# Patient Record
Sex: FEMALE | Race: BLACK OR AFRICAN AMERICAN | NOT HISPANIC OR LATINO | URBAN - METROPOLITAN AREA
[De-identification: names, ages, dates, MRNs, and addresses within clinical notes are randomized per-mention and may not be internally consistent; named-entity substitution may affect disease eponyms.]

---

## 2018-06-14 ENCOUNTER — OUTPATIENT (OUTPATIENT)
Dept: OUTPATIENT SERVICES | Facility: HOSPITAL | Age: 33
LOS: 1 days | End: 2018-06-14
Payer: COMMERCIAL

## 2018-06-14 VITALS
OXYGEN SATURATION: 100 % | DIASTOLIC BLOOD PRESSURE: 72 MMHG | SYSTOLIC BLOOD PRESSURE: 105 MMHG | RESPIRATION RATE: 16 BRPM | WEIGHT: 175.93 LBS | TEMPERATURE: 98 F | HEIGHT: 68 IN | HEART RATE: 80 BPM

## 2018-06-14 DIAGNOSIS — Z96.7 PRESENCE OF OTHER BONE AND TENDON IMPLANTS: Chronic | ICD-10-CM

## 2018-06-14 DIAGNOSIS — M65.4 RADIAL STYLOID TENOSYNOVITIS [DE QUERVAIN]: ICD-10-CM

## 2018-06-14 DIAGNOSIS — M65.9 SYNOVITIS AND TENOSYNOVITIS, UNSPECIFIED: ICD-10-CM

## 2018-06-14 DIAGNOSIS — Z01.818 ENCOUNTER FOR OTHER PREPROCEDURAL EXAMINATION: ICD-10-CM

## 2018-06-14 LAB
HCG SERPL-ACNC: <1 MIU/ML — SIGNIFICANT CHANGE UP
HCT VFR BLD CALC: 29.4 % — LOW (ref 34.5–45)
HGB BLD-MCNC: 8.9 G/DL — LOW (ref 11.5–15.5)
MCHC RBC-ENTMCNC: 21.9 PG — LOW (ref 27–34)
MCHC RBC-ENTMCNC: 30.3 GM/DL — LOW (ref 32–36)
MCV RBC AUTO: 72.4 FL — LOW (ref 80–100)
NRBC # BLD: 0 /100 WBCS — SIGNIFICANT CHANGE UP (ref 0–0)
PLATELET # BLD AUTO: 322 K/UL — SIGNIFICANT CHANGE UP (ref 150–400)
RBC # BLD: 4.06 M/UL — SIGNIFICANT CHANGE UP (ref 3.8–5.2)
RBC # FLD: 19 % — HIGH (ref 10.3–14.5)
WBC # BLD: 4.19 K/UL — SIGNIFICANT CHANGE UP (ref 3.8–10.5)
WBC # FLD AUTO: 4.19 K/UL — SIGNIFICANT CHANGE UP (ref 3.8–10.5)

## 2018-06-14 PROCEDURE — 85027 COMPLETE CBC AUTOMATED: CPT

## 2018-06-14 PROCEDURE — G0463: CPT

## 2018-06-14 PROCEDURE — 84702 CHORIONIC GONADOTROPIN TEST: CPT

## 2018-06-14 NOTE — H&P PST ADULT - HISTORY OF PRESENT ILLNESS
This is a 33y/o female with no significant PMH. Current dx: DeQuervain's Tenosynovitis Right Wrist. Scheduled: Right Wrist DeQuervain's Release/ Tenolysis/ Extensor Tenotomy.

## 2018-06-14 NOTE — H&P PST ADULT - EXTREMITIES
Subjective   Cass Wilkins is a 41 y.o. female.     History of Present Illness   Travelling to [place] and wants vaccines updated. Will not be in a remote area. Visiting family in [place]. Had Tdap 2017. Due for Hep A.  Still has eczema and rash on legs. Has not seen dermatology yet. Tried Eucrisa without relief.  The following portions of the patient's history were reviewed and updated as appropriate: allergies, current medications, past family history, past medical history, past social history, past surgical history and problem list.    Review of Systems   Constitutional: Negative.  Negative for activity change, fatigue, fever, unexpected weight gain and unexpected weight loss.   HENT: Negative.  Negative for congestion, sneezing and sore throat.    Eyes: Negative.  Negative for blurred vision, double vision and visual disturbance.   Respiratory: Negative.  Negative for cough, chest tightness, shortness of breath and wheezing.    Cardiovascular: Negative.  Negative for chest pain, palpitations and leg swelling.   Gastrointestinal: Negative.  Negative for abdominal distention, abdominal pain, blood in stool, constipation, diarrhea and nausea.   Endocrine: Negative.  Negative for cold intolerance and heat intolerance.   Genitourinary: Negative.  Negative for urinary incontinence, dysuria, frequency and urgency.   Musculoskeletal: Negative.  Negative for arthralgias and myalgias.   Skin: Negative.  Negative for rash.   Allergic/Immunologic: Negative.    Neurological: Negative.  Negative for dizziness, syncope, numbness, headaches and memory problem.   Hematological: Negative.  Negative for adenopathy.   Psychiatric/Behavioral: Negative.  Negative for suicidal ideas and depressed mood. The patient is not nervous/anxious.    All other systems reviewed and are negative.      Objective   Physical Exam   Constitutional: She is oriented to person, place, and time. She appears well-developed and well-nourished.   HENT:   Head:  Normocephalic.   Right Ear: External ear normal.   Left Ear: External ear normal.   Nose: Nose normal.   Mouth/Throat: Oropharynx is clear and moist. No oropharyngeal exudate.   Eyes: Conjunctivae and EOM are normal. Pupils are equal, round, and reactive to light.   Neck: Normal range of motion. Neck supple. No thyromegaly present.   Cardiovascular: Normal rate, regular rhythm, normal heart sounds and intact distal pulses.    No murmur heard.  Pulmonary/Chest: Effort normal and breath sounds normal. No respiratory distress. She exhibits no tenderness.   Abdominal: Soft. Bowel sounds are normal. She exhibits no distension and no mass. There is no tenderness. There is no rebound and no guarding.   Musculoskeletal: Normal range of motion.   Lymphadenopathy:     She has no cervical adenopathy.   Neurological: She is alert and oriented to person, place, and time. She has normal reflexes. She displays normal reflexes. She exhibits normal muscle tone. Coordination normal.   Skin: Skin is warm and dry. No lesion and no rash noted. She is not diaphoretic. No erythema.   Psychiatric: She has a normal mood and affect. Her behavior is normal. Judgment and thought content normal.   Nursing note and vitals reviewed.        Assessment/Plan   Cass was seen today for immunizations.    Diagnoses and all orders for this visit:    Immunization counseling  -     hepatitis A (HAVRIX) vaccine 1,440 Units; Inject 1 mL into the shoulder, thigh, or buttocks During Hospitalization for Immunization.    Eczema, unspecified type  -     Ambulatory Referral to Dermatology    Other orders  -     fluocinonide-emollient (LIDEX-E) 0.05 % cream; Apply  topically Daily.                detailed exam

## 2018-06-18 RX ORDER — SODIUM CHLORIDE 9 MG/ML
1000 INJECTION, SOLUTION INTRAVENOUS
Qty: 0 | Refills: 0 | Status: DISCONTINUED | OUTPATIENT
Start: 2018-06-27 | End: 2018-06-27

## 2018-06-26 ENCOUNTER — TRANSCRIPTION ENCOUNTER (OUTPATIENT)
Age: 33
End: 2018-06-26

## 2018-06-26 RX ORDER — ACETAMINOPHEN 500 MG
975 TABLET ORAL ONCE
Qty: 0 | Refills: 0 | Status: COMPLETED | OUTPATIENT
Start: 2018-06-27 | End: 2018-06-27

## 2018-06-27 ENCOUNTER — OUTPATIENT (OUTPATIENT)
Dept: OUTPATIENT SERVICES | Facility: HOSPITAL | Age: 33
LOS: 1 days | End: 2018-06-27
Payer: COMMERCIAL

## 2018-06-27 VITALS
HEART RATE: 58 BPM | WEIGHT: 175.93 LBS | SYSTOLIC BLOOD PRESSURE: 120 MMHG | RESPIRATION RATE: 13 BRPM | TEMPERATURE: 99 F | HEIGHT: 68 IN | DIASTOLIC BLOOD PRESSURE: 55 MMHG | OXYGEN SATURATION: 100 %

## 2018-06-27 VITALS
SYSTOLIC BLOOD PRESSURE: 124 MMHG | DIASTOLIC BLOOD PRESSURE: 78 MMHG | TEMPERATURE: 98 F | OXYGEN SATURATION: 99 % | RESPIRATION RATE: 14 BRPM | HEART RATE: 67 BPM

## 2018-06-27 DIAGNOSIS — Z96.7 PRESENCE OF OTHER BONE AND TENDON IMPLANTS: Chronic | ICD-10-CM

## 2018-06-27 DIAGNOSIS — Z01.818 ENCOUNTER FOR OTHER PREPROCEDURAL EXAMINATION: ICD-10-CM

## 2018-06-27 DIAGNOSIS — M65.4 RADIAL STYLOID TENOSYNOVITIS [DE QUERVAIN]: ICD-10-CM

## 2018-06-27 LAB — HCG UR QL: NEGATIVE — SIGNIFICANT CHANGE UP

## 2018-06-27 PROCEDURE — 25109 EXCISE TENDON FOREARM/WRIST: CPT | Mod: RT

## 2018-06-27 PROCEDURE — 87340 HEPATITIS B SURFACE AG IA: CPT

## 2018-06-27 PROCEDURE — 86803 HEPATITIS C AB TEST: CPT

## 2018-06-27 PROCEDURE — 36415 COLL VENOUS BLD VENIPUNCTURE: CPT

## 2018-06-27 PROCEDURE — 86703 HIV-1/HIV-2 1 RESULT ANTBDY: CPT

## 2018-06-27 PROCEDURE — 25295 RELEASE WRIST/FOREARM TENDON: CPT | Mod: RT

## 2018-06-27 PROCEDURE — 87522 HEPATITIS C REVRS TRNSCRPJ: CPT

## 2018-06-27 PROCEDURE — 86704 HEP B CORE ANTIBODY TOTAL: CPT

## 2018-06-27 PROCEDURE — 86706 HEP B SURFACE ANTIBODY: CPT

## 2018-06-27 PROCEDURE — 87535 HIV-1 PROBE&REVERSE TRNSCRPJ: CPT

## 2018-06-27 PROCEDURE — 87389 HIV-1 AG W/HIV-1&-2 AB AG IA: CPT

## 2018-06-27 PROCEDURE — 81025 URINE PREGNANCY TEST: CPT

## 2018-06-27 RX ORDER — CEFAZOLIN SODIUM 1 G
2000 VIAL (EA) INJECTION ONCE
Qty: 0 | Refills: 0 | Status: COMPLETED | OUTPATIENT
Start: 2018-06-27 | End: 2018-06-27

## 2018-06-27 RX ADMIN — Medication 975 MILLIGRAM(S): at 13:32

## 2018-06-27 RX ADMIN — SODIUM CHLORIDE 75 MILLILITER(S): 9 INJECTION, SOLUTION INTRAVENOUS at 13:32

## 2019-11-29 ENCOUNTER — INPATIENT (INPATIENT)
Facility: HOSPITAL | Age: 34
LOS: 1 days | Discharge: HOME | End: 2019-12-01
Attending: INTERNAL MEDICINE | Admitting: INTERNAL MEDICINE
Payer: COMMERCIAL

## 2019-11-29 VITALS
DIASTOLIC BLOOD PRESSURE: 70 MMHG | WEIGHT: 156.09 LBS | RESPIRATION RATE: 18 BRPM | OXYGEN SATURATION: 100 % | TEMPERATURE: 99 F | HEART RATE: 84 BPM | HEIGHT: 68 IN | SYSTOLIC BLOOD PRESSURE: 102 MMHG

## 2019-11-29 DIAGNOSIS — Z96.7 PRESENCE OF OTHER BONE AND TENDON IMPLANTS: Chronic | ICD-10-CM

## 2019-11-29 PROBLEM — M65.9 SYNOVITIS AND TENOSYNOVITIS, UNSPECIFIED: Chronic | Status: ACTIVE | Noted: 2018-06-14

## 2019-11-29 PROBLEM — S42.009A FRACTURE OF UNSPECIFIED PART OF UNSPECIFIED CLAVICLE, INITIAL ENCOUNTER FOR CLOSED FRACTURE: Chronic | Status: ACTIVE | Noted: 2018-06-14

## 2019-11-29 LAB
ABO RH CONFIRMATION: SIGNIFICANT CHANGE UP
ALBUMIN SERPL ELPH-MCNC: 3.6 G/DL — SIGNIFICANT CHANGE UP (ref 3.3–5)
ALP SERPL-CCNC: 54 U/L — SIGNIFICANT CHANGE UP (ref 40–120)
ALT FLD-CCNC: 15 U/L — SIGNIFICANT CHANGE UP (ref 12–78)
ANION GAP SERPL CALC-SCNC: 6 MMOL/L — SIGNIFICANT CHANGE UP (ref 5–17)
ANISOCYTOSIS BLD QL: SIGNIFICANT CHANGE UP
APTT BLD: 28.8 SEC — SIGNIFICANT CHANGE UP (ref 27.5–36.3)
AST SERPL-CCNC: 15 U/L — SIGNIFICANT CHANGE UP (ref 15–37)
BASOPHILS # BLD AUTO: 0.04 K/UL — SIGNIFICANT CHANGE UP (ref 0–0.2)
BASOPHILS NFR BLD AUTO: 0.5 % — SIGNIFICANT CHANGE UP (ref 0–2)
BILIRUB SERPL-MCNC: 0.3 MG/DL — SIGNIFICANT CHANGE UP (ref 0.2–1.2)
BLD GP AB SCN SERPL QL: SIGNIFICANT CHANGE UP
BUN SERPL-MCNC: 8 MG/DL — SIGNIFICANT CHANGE UP (ref 7–23)
CALCIUM SERPL-MCNC: 8.9 MG/DL — SIGNIFICANT CHANGE UP (ref 8.5–10.1)
CHLORIDE SERPL-SCNC: 110 MMOL/L — HIGH (ref 96–108)
CO2 SERPL-SCNC: 24 MMOL/L — SIGNIFICANT CHANGE UP (ref 22–31)
CREAT SERPL-MCNC: 0.77 MG/DL — SIGNIFICANT CHANGE UP (ref 0.5–1.3)
ELLIPTOCYTES BLD QL SMEAR: SLIGHT — SIGNIFICANT CHANGE UP
EOSINOPHIL # BLD AUTO: 0.09 K/UL — SIGNIFICANT CHANGE UP (ref 0–0.5)
EOSINOPHIL NFR BLD AUTO: 1.2 % — SIGNIFICANT CHANGE UP (ref 0–6)
GLUCOSE SERPL-MCNC: 84 MG/DL — SIGNIFICANT CHANGE UP (ref 70–99)
HCG SERPL-ACNC: <1 MIU/ML — SIGNIFICANT CHANGE UP
HCT VFR BLD CALC: 22.5 % — LOW (ref 34.5–45)
HGB BLD-MCNC: 6.2 G/DL — CRITICAL LOW (ref 11.5–15.5)
HYPOCHROMIA BLD QL: SLIGHT — SIGNIFICANT CHANGE UP
IMM GRANULOCYTES NFR BLD AUTO: 0.1 % — SIGNIFICANT CHANGE UP (ref 0–1.5)
INR BLD: 1.14 RATIO — SIGNIFICANT CHANGE UP (ref 0.88–1.16)
LYMPHOCYTES # BLD AUTO: 2.87 K/UL — SIGNIFICANT CHANGE UP (ref 1–3.3)
LYMPHOCYTES # BLD AUTO: 36.7 % — SIGNIFICANT CHANGE UP (ref 13–44)
MANUAL SMEAR VERIFICATION: SIGNIFICANT CHANGE UP
MCHC RBC-ENTMCNC: 18.3 PG — LOW (ref 27–34)
MCHC RBC-ENTMCNC: 27.6 GM/DL — LOW (ref 32–36)
MCV RBC AUTO: 66.6 FL — LOW (ref 80–100)
MICROCYTES BLD QL: SIGNIFICANT CHANGE UP
MONOCYTES # BLD AUTO: 0.54 K/UL — SIGNIFICANT CHANGE UP (ref 0–0.9)
MONOCYTES NFR BLD AUTO: 6.9 % — SIGNIFICANT CHANGE UP (ref 2–14)
NEUTROPHILS # BLD AUTO: 4.27 K/UL — SIGNIFICANT CHANGE UP (ref 1.8–7.4)
NEUTROPHILS NFR BLD AUTO: 54.6 % — SIGNIFICANT CHANGE UP (ref 43–77)
NRBC # BLD: 0 /100 WBCS — SIGNIFICANT CHANGE UP (ref 0–0)
OVALOCYTES BLD QL SMEAR: SLIGHT — SIGNIFICANT CHANGE UP
PLAT MORPH BLD: NORMAL — SIGNIFICANT CHANGE UP
PLATELET # BLD AUTO: 637 K/UL — HIGH (ref 150–400)
POLYCHROMASIA BLD QL SMEAR: SLIGHT — SIGNIFICANT CHANGE UP
POTASSIUM SERPL-MCNC: 4.1 MMOL/L — SIGNIFICANT CHANGE UP (ref 3.5–5.3)
POTASSIUM SERPL-SCNC: 4.1 MMOL/L — SIGNIFICANT CHANGE UP (ref 3.5–5.3)
PROT SERPL-MCNC: 7.5 GM/DL — SIGNIFICANT CHANGE UP (ref 6–8.3)
PROTHROM AB SERPL-ACNC: 12.8 SEC — SIGNIFICANT CHANGE UP (ref 10–12.9)
RBC # BLD: 3.38 M/UL — LOW (ref 3.8–5.2)
RBC # FLD: 28.3 % — HIGH (ref 10.3–14.5)
RBC BLD AUTO: ABNORMAL
SODIUM SERPL-SCNC: 140 MMOL/L — SIGNIFICANT CHANGE UP (ref 135–145)
TARGETS BLD QL SMEAR: SLIGHT — SIGNIFICANT CHANGE UP
WBC # BLD: 7.82 K/UL — SIGNIFICANT CHANGE UP (ref 3.8–10.5)
WBC # FLD AUTO: 7.82 K/UL — SIGNIFICANT CHANGE UP (ref 3.8–10.5)

## 2019-11-29 PROCEDURE — 99291 CRITICAL CARE FIRST HOUR: CPT

## 2019-11-29 PROCEDURE — 99223 1ST HOSP IP/OBS HIGH 75: CPT

## 2019-11-29 PROCEDURE — 76830 TRANSVAGINAL US NON-OB: CPT | Mod: 26

## 2019-11-29 NOTE — ED ADULT NURSE REASSESSMENT NOTE - NS ED NURSE REASSESS COMMENT FT1
Assuming patient's care for coverage. Report received from RN and in [employer] at this time. Assessment available on [facility]. Will continue to monitor

## 2019-11-29 NOTE — ED ADULT NURSE REASSESSMENT NOTE - NS ED NURSE REASSESS COMMENT FT1
rcvd pt aaox3,  no acute distress noted.  no adverse reaction to blood transfusion noted.  pt denies pain at this time.

## 2019-11-29 NOTE — H&P ADULT - NSHPLABSRESULTS_GEN_ALL_CORE
6.2    7.82  )-----------( 637      ( 29 Nov 2019 13:49 )             22.5     11-29    140  |  110<H>  |  8   ----------------------------<  84  4.1   |  24  |  0.77    Ca    8.9      29 Nov 2019 13:49    TPro  7.5  /  Alb  3.6  /  TBili  0.3  /  DBili  x   /  AST  15  /  ALT  15  /  AlkPhos  54  11-29    PT/INR - ( 29 Nov 2019 13:49 )   PT: 12.8 sec;   INR: 1.14 ratio         PTT - ( 29 Nov 2019 13:49 )  PTT:28.8 sec

## 2019-11-29 NOTE — ED ADULT NURSE NOTE - OBJECTIVE STATEMENT
Patient sent to  ED from PCP for  abnormal lab, patient voiced pain to R/ LLQ and SOb for the past 2 months on and off

## 2019-11-29 NOTE — H&P ADULT - ASSESSMENT
35 y/o female with a PMH of fibroids and anemia, sent to ED by PMD for hemoglobin (5.9, 11-25-19).  Pt complains of chronic heavy menstruation and has fibroid uterus.    Severe anemia:  - Hb 6.2 today  - Will transfuse 1 unit PRBC  - Likely secondary to menorrhagia, though will consider doing further hem work up, can be done outpt  - Start PO iron supplement.  - Gyn to see pt.    DVT ppx:  - SCds

## 2019-11-29 NOTE — ED PROVIDER NOTE - OBJECTIVE STATEMENT
35 y/o female with a PMHx of fibroids and anemia, sent to ED by PMD for hemoglobin (5.9, 11-25-19). Pt reports she has been having heavy menstrual bleeding from 11-19 to 11-26. C/o generalized weakness, denies CP, SOB, palpitations, and bleeding at this time. 33 y/o female with a PMHx of fibroids and anemia, sent to ED by PMD for hemoglobin (5.9, 11-25-19). Pt reports she has been having heavy menstrual bleeding from 11-19 to 11-26. C/o generalized weakness and dyspnea on exertion-  denies CP, palpitations, and bleeding at this time. Reports right sided pelvic pain, non radiating, sharp. Denies nausea, vomiting, diarrhea, fevers, chills, urinary symptoms.

## 2019-11-29 NOTE — H&P ADULT - NSICDXPASTSURGICALHX_GEN_ALL_CORE_FT
PAST SURGICAL HISTORY:  S/P ORIF (open reduction internal fixation) fracture ' 2009  Left Clavicle fracture

## 2019-11-29 NOTE — ED PROVIDER NOTE - CLINICAL SUMMARY MEDICAL DECISION MAKING FREE TEXT BOX
Female presents to ED for evaluation of low hemoglobin, LLOYD, generalized weakness - Will check labs, imaging, medicate, consult gyn, adm

## 2019-11-29 NOTE — ED ADULT TRIAGE NOTE - CHIEF COMPLAINT QUOTE
pt has fibroids and heavy menstruation here for evaluation of anemia due to menorrhea, last day of menstrual cycle yesterday, pt with right sided abdomen pain

## 2019-11-29 NOTE — ED ADULT NURSE NOTE - NSIMPLEMENTINTERV_GEN_ALL_ED
Implemented All Universal Safety Interventions:  [place] to call system. Call bell, personal items and telephone within reach. Instruct patient to call for assistance. Room bathroom lighting operational. Non-slip footwear when patient is off stretcher. Physically safe environment: no spills, clutter or unnecessary equipment. Stretcher in lowest position, wheels locked, appropriate side rails in place.

## 2019-11-29 NOTE — ED ADULT NURSE NOTE - PMH
Anemia    Clavicle fracture  ' 2009  Left. surgically treated  Tenosynovitis of hand or wrist  Right

## 2019-11-29 NOTE — H&P ADULT - NSICDXPASTMEDICALHX_GEN_ALL_CORE_FT
PAST MEDICAL HISTORY:  Anemia     Clavicle fracture ' 2009  Left. surgically treated    Tenosynovitis of hand or wrist Right

## 2019-11-29 NOTE — ED PROVIDER NOTE - NS ED ROS FT
Constitutional: no fevers or chills, +weakness  Cardiac: no palpitations, chest pain  Lungs: no shortness of breath, wheezes  Abd: no abd pain, nausea, vomiting, diarrhea  Genitourinary: no dysuria, increased urinary frequency, hematuria  Neurology: no sensorimotor deficits, no dizziness, no headache, no visual changes  Skin: no rashes  All other ROS negative except as per HPI Constitutional: no fevers or chills, +weakness  Cardiac: no palpitations, chest pain  Lungs: no shortness of breath, wheezes  Abd: + abd pain, denies nausea, vomiting, diarrhea  Genitourinary: no dysuria, increased urinary frequency, hematuria  Neurology: no sensorimotor deficits, no dizziness, no headache, no visual changes  Skin: no rashes  All other ROS negative except as per HPI

## 2019-11-29 NOTE — H&P ADULT - HISTORY OF PRESENT ILLNESS
35 y/o female with a PMH of fibroids and anemia, sent to ED by PMD for hemoglobin (5.9, 11-25-19).  Pt complains of chronic heavy menstruation and has fibroid uterus.  Pt saw her gyn ~ 2 yrs agio and PMD ~ 1yrs ago until this recent visit.  Also has some rt groin pain that she intermittently had before too.

## 2019-11-30 LAB
ALBUMIN SERPL ELPH-MCNC: 3.3 G/DL — SIGNIFICANT CHANGE UP (ref 3.3–5)
ALP SERPL-CCNC: 57 U/L — SIGNIFICANT CHANGE UP (ref 40–120)
ALT FLD-CCNC: 14 U/L — SIGNIFICANT CHANGE UP (ref 12–78)
ANION GAP SERPL CALC-SCNC: 4 MMOL/L — LOW (ref 5–17)
AST SERPL-CCNC: 14 U/L — LOW (ref 15–37)
BILIRUB SERPL-MCNC: 0.4 MG/DL — SIGNIFICANT CHANGE UP (ref 0.2–1.2)
BUN SERPL-MCNC: 8 MG/DL — SIGNIFICANT CHANGE UP (ref 7–23)
CALCIUM SERPL-MCNC: 8.7 MG/DL — SIGNIFICANT CHANGE UP (ref 8.5–10.1)
CHLORIDE SERPL-SCNC: 112 MMOL/L — HIGH (ref 96–108)
CO2 SERPL-SCNC: 25 MMOL/L — SIGNIFICANT CHANGE UP (ref 22–31)
CREAT SERPL-MCNC: 0.79 MG/DL — SIGNIFICANT CHANGE UP (ref 0.5–1.3)
GLUCOSE SERPL-MCNC: 78 MG/DL — SIGNIFICANT CHANGE UP (ref 70–99)
HCT VFR BLD CALC: 25.9 % — LOW (ref 34.5–45)
HGB BLD-MCNC: 7.4 G/DL — LOW (ref 11.5–15.5)
MCHC RBC-ENTMCNC: 19.8 PG — LOW (ref 27–34)
MCHC RBC-ENTMCNC: 28.6 GM/DL — LOW (ref 32–36)
MCV RBC AUTO: 69.3 FL — LOW (ref 80–100)
NRBC # BLD: 0 /100 WBCS — SIGNIFICANT CHANGE UP (ref 0–0)
PLATELET # BLD AUTO: 616 K/UL — HIGH (ref 150–400)
POTASSIUM SERPL-MCNC: 4.3 MMOL/L — SIGNIFICANT CHANGE UP (ref 3.5–5.3)
POTASSIUM SERPL-SCNC: 4.3 MMOL/L — SIGNIFICANT CHANGE UP (ref 3.5–5.3)
PROT SERPL-MCNC: 6.7 GM/DL — SIGNIFICANT CHANGE UP (ref 6–8.3)
RBC # BLD: 3.74 M/UL — LOW (ref 3.8–5.2)
RBC # FLD: 29 % — HIGH (ref 10.3–14.5)
SODIUM SERPL-SCNC: 141 MMOL/L — SIGNIFICANT CHANGE UP (ref 135–145)
WBC # BLD: 7.39 K/UL — SIGNIFICANT CHANGE UP (ref 3.8–10.5)
WBC # FLD AUTO: 7.39 K/UL — SIGNIFICANT CHANGE UP (ref 3.8–10.5)

## 2019-11-30 PROCEDURE — 99233 SBSQ HOSP IP/OBS HIGH 50: CPT

## 2019-11-30 NOTE — CHART NOTE - NSCHARTNOTEFT_GEN_A_CORE
Discussed with Dr HOGAN, Once patient is medically stable and blood transfusions complete, may be discharged to f/u in his office for further treatment.

## 2019-11-30 NOTE — PROGRESS NOTE ADULT - ASSESSMENT
35 y/o female with a PMH of fibroids and anemia, sent to ED by PMD for hemoglobin (5.9, 11-25-19).  Pt complains of chronic heavy menstruation and has fibroid uterus.    Severe anemia:  - Hb 6.2 on admission.  - S/P 1 unit PRBC  - Likely secondary to menorrhagia, though will consider doing further hem work up, can be done outpt  - Started PO iron supplement.  - Gyn to see pt.    DVT ppx:  - SCDs

## 2019-11-30 NOTE — PROGRESS NOTE ADULT - SUBJECTIVE AND OBJECTIVE BOX
Patient is a 34y old  Female who presents with a chief complaint of symptomatic anemia (29 Nov 2019 19:24)      INTERVAL HPI/OVERNIGHT EVENTS:  Pt was seen and examined, no acute events.    MEDICATIONS  (STANDING):    MEDICATIONS  (PRN):      Allergies  No Known Allergies      Vital Signs Last 24 Hrs  T(C): 37.1 (30 Nov 2019 11:09), Max: 37.1 (30 Nov 2019 11:09)  T(F): 98.7 (30 Nov 2019 11:09), Max: 98.7 (30 Nov 2019 11:09)  HR: 65 (30 Nov 2019 11:09) (61 - 71)  BP: 102/56 (30 Nov 2019 11:09) (102/56 - 124/76)  BP(mean): --  RR: 16 (30 Nov 2019 11:09) (14 - 18)  SpO2: 100% (30 Nov 2019 11:09) (97% - 100%)    PHYSICAL EXAM:  GENERAL: NAD  HEAD:  Atraumatic  EYES: PERRLA  NERVOUS SYSTEM:  A, o x 3, non focal  CHEST/LUNG: Clear  HEART: RRR  ABDOMEN: Soft, non tender  EXTREMITIES:  no edema      LABS:                        7.4    7.39  )-----------( 616      ( 30 Nov 2019 07:25 )             25.9     11-30    141  |  112<H>  |  8   ----------------------------<  78  4.3   |  25  |  0.79    Ca    8.7      30 Nov 2019 07:25    TPro  6.7  /  Alb  3.3  /  TBili  0.4  /  DBili  x   /  AST  14<L>  /  ALT  14  /  AlkPhos  57  11-30    PT/INR - ( 29 Nov 2019 13:49 )   PT: 12.8 sec;   INR: 1.14 ratio         PTT - ( 29 Nov 2019 13:49 )  PTT:28.8 sec    CAPILLARY BLOOD GLUCOSE      RADIOLOGY & ADDITIONAL TESTS:    Imaging Personally Reviewed:  [ ] YES  [ ] NO    Consultant(s) Notes Reviewed:  [ ] YES  [ ] NO    Care Discussed with Consultants/Other Providers [ ] YES  [ ] NO

## 2019-12-01 ENCOUNTER — TRANSCRIPTION ENCOUNTER (OUTPATIENT)
Age: 34
End: 2019-12-01

## 2019-12-01 VITALS
HEART RATE: 74 BPM | TEMPERATURE: 99 F | SYSTOLIC BLOOD PRESSURE: 112 MMHG | DIASTOLIC BLOOD PRESSURE: 66 MMHG | OXYGEN SATURATION: 100 % | RESPIRATION RATE: 18 BRPM

## 2019-12-01 LAB
HCT VFR BLD CALC: 26.9 % — LOW (ref 34.5–45)
HGB BLD-MCNC: 7.7 G/DL — LOW (ref 11.5–15.5)
MCHC RBC-ENTMCNC: 19.8 PG — LOW (ref 27–34)
MCHC RBC-ENTMCNC: 28.6 GM/DL — LOW (ref 32–36)
MCV RBC AUTO: 69.3 FL — LOW (ref 80–100)
NRBC # BLD: 0 /100 WBCS — SIGNIFICANT CHANGE UP (ref 0–0)
PLATELET # BLD AUTO: 652 K/UL — HIGH (ref 150–400)
RBC # BLD: 3.88 M/UL — SIGNIFICANT CHANGE UP (ref 3.8–5.2)
RBC # FLD: 29.2 % — HIGH (ref 10.3–14.5)
WBC # BLD: 7.84 K/UL — SIGNIFICANT CHANGE UP (ref 3.8–10.5)
WBC # FLD AUTO: 7.84 K/UL — SIGNIFICANT CHANGE UP (ref 3.8–10.5)

## 2019-12-01 PROCEDURE — 99233 SBSQ HOSP IP/OBS HIGH 50: CPT

## 2019-12-01 RX ORDER — FERROUS SULFATE 325(65) MG
1 TABLET ORAL
Qty: 60 | Refills: 0
Start: 2019-12-01 | End: 2019-12-30

## 2019-12-01 NOTE — DISCHARGE NOTE NURSING/CASE MANAGEMENT/SOCIAL WORK - PATIENT PORTAL LINK FT
You can access the FollowMyHealth Patient Portal offered by [facility] by registering at the following website: [email or web address]. By joining [employer]’s FollowMyHealth portal, you will also be able to view your health information using other applications (apps) compatible with our system.

## 2019-12-01 NOTE — DISCHARGE NOTE PROVIDER - CARE PROVIDER_API CALL
Benitez Betancur)  Obstetrics and Gynecology  [address] [place] [address]  Phone: [phone]  Fax: [phone]  Follow Up Time:     Raulito Dennison)  Hematology; Internal Medicine; Medical Oncology  [address], [address]  [place] [address]  Phone: [phone]  Fax: [phone]  Follow Up Time:

## 2019-12-01 NOTE — DISCHARGE NOTE PROVIDER - HOSPITAL COURSE
35 y/o female with a PMH of fibroids and anemia, sent to ED by PMD for hemoglobin (5.9, 11-25-19).    Pt complains of chronic heavy menstruation and has fibroid uterus.        Severe anemia:    - Hb 6.2 on admission.    - S/P 1 unit PRBC    - Likely secondary to menorrhagia, though will consider doing further hem work up, can be done outpt    - Started PO iron supplement.    - Gyn follow up.        DVT ppx:    - SCDs

## 2019-12-01 NOTE — DISCHARGE NOTE PROVIDER - NSDCCPCAREPLAN_GEN_ALL_CORE_FT
PRINCIPAL DISCHARGE DIAGNOSIS  Diagnosis: Anemia  Assessment and Plan of Treatment: iron supplement  Follo wup with Gyn and hemoatologist      SECONDARY DISCHARGE DIAGNOSES  Diagnosis: Menorrhagia  Assessment and Plan of Treatment: Follow up Gyn

## 2019-12-05 DIAGNOSIS — D64.9 ANEMIA, UNSPECIFIED: ICD-10-CM

## 2019-12-05 DIAGNOSIS — D25.9 LEIOMYOMA OF UTERUS, UNSPECIFIED: ICD-10-CM

## 2019-12-05 DIAGNOSIS — N92.0 EXCESSIVE AND FREQUENT MENSTRUATION WITH REGULAR CYCLE: ICD-10-CM

## 2019-12-12 DIAGNOSIS — D64.9 ANEMIA, UNSPECIFIED: ICD-10-CM

## 2020-07-07 ENCOUNTER — EMERGENCY (EMERGENCY)
Facility: HOSPITAL | Age: 35
LOS: 0 days | Discharge: HOME | End: 2020-07-07
Attending: EMERGENCY MEDICINE
Payer: COMMERCIAL

## 2020-07-07 VITALS
SYSTOLIC BLOOD PRESSURE: 116 MMHG | TEMPERATURE: 98 F | RESPIRATION RATE: 19 BRPM | HEART RATE: 64 BPM | WEIGHT: 167.99 LBS | OXYGEN SATURATION: 100 % | DIASTOLIC BLOOD PRESSURE: 71 MMHG | HEIGHT: 68 IN

## 2020-07-07 VITALS
DIASTOLIC BLOOD PRESSURE: 66 MMHG | TEMPERATURE: 98 F | HEART RATE: 66 BPM | OXYGEN SATURATION: 99 % | SYSTOLIC BLOOD PRESSURE: 119 MMHG | RESPIRATION RATE: 18 BRPM

## 2020-07-07 DIAGNOSIS — Z96.7 PRESENCE OF OTHER BONE AND TENDON IMPLANTS: Chronic | ICD-10-CM

## 2020-07-07 LAB
ALBUMIN SERPL ELPH-MCNC: 3.4 G/DL — SIGNIFICANT CHANGE UP (ref 3.3–5)
ALP SERPL-CCNC: 52 U/L — SIGNIFICANT CHANGE UP (ref 40–120)
ALT FLD-CCNC: 21 U/L — SIGNIFICANT CHANGE UP (ref 12–78)
ANION GAP SERPL CALC-SCNC: 5 MMOL/L — SIGNIFICANT CHANGE UP (ref 5–17)
APPEARANCE UR: CLEAR — SIGNIFICANT CHANGE UP
AST SERPL-CCNC: 8 U/L — LOW (ref 15–37)
BASOPHILS # BLD AUTO: 0.01 K/UL — SIGNIFICANT CHANGE UP (ref 0–0.2)
BASOPHILS NFR BLD AUTO: 0.2 % — SIGNIFICANT CHANGE UP (ref 0–2)
BILIRUB SERPL-MCNC: 0.4 MG/DL — SIGNIFICANT CHANGE UP (ref 0.2–1.2)
BILIRUB UR-MCNC: ABNORMAL
BUN SERPL-MCNC: 4 MG/DL — LOW (ref 7–23)
CALCIUM SERPL-MCNC: 8.2 MG/DL — LOW (ref 8.5–10.1)
CHLORIDE SERPL-SCNC: 109 MMOL/L — HIGH (ref 96–108)
CO2 SERPL-SCNC: 26 MMOL/L — SIGNIFICANT CHANGE UP (ref 22–31)
COLOR SPEC: ABNORMAL
CREAT SERPL-MCNC: 0.68 MG/DL — SIGNIFICANT CHANGE UP (ref 0.5–1.3)
DIFF PNL FLD: NEGATIVE — SIGNIFICANT CHANGE UP
EOSINOPHIL # BLD AUTO: 0.1 K/UL — SIGNIFICANT CHANGE UP (ref 0–0.5)
EOSINOPHIL NFR BLD AUTO: 2 % — SIGNIFICANT CHANGE UP (ref 0–6)
EPI CELLS # UR: ABNORMAL
GLUCOSE SERPL-MCNC: 78 MG/DL — SIGNIFICANT CHANGE UP (ref 70–99)
GLUCOSE UR QL: NEGATIVE MG/DL — SIGNIFICANT CHANGE UP
HCG SERPL-ACNC: <1 MIU/ML — SIGNIFICANT CHANGE UP
HCT VFR BLD CALC: 32.5 % — LOW (ref 34.5–45)
HGB BLD-MCNC: 10.6 G/DL — LOW (ref 11.5–15.5)
IMM GRANULOCYTES NFR BLD AUTO: 0 % — SIGNIFICANT CHANGE UP (ref 0–1.5)
KETONES UR-MCNC: NEGATIVE — SIGNIFICANT CHANGE UP
LEUKOCYTE ESTERASE UR-ACNC: NEGATIVE — SIGNIFICANT CHANGE UP
LYMPHOCYTES # BLD AUTO: 1.97 K/UL — SIGNIFICANT CHANGE UP (ref 1–3.3)
LYMPHOCYTES # BLD AUTO: 38.9 % — SIGNIFICANT CHANGE UP (ref 13–44)
MCHC RBC-ENTMCNC: 28.4 PG — SIGNIFICANT CHANGE UP (ref 27–34)
MCHC RBC-ENTMCNC: 32.6 GM/DL — SIGNIFICANT CHANGE UP (ref 32–36)
MCV RBC AUTO: 87.1 FL — SIGNIFICANT CHANGE UP (ref 80–100)
MONOCYTES # BLD AUTO: 0.37 K/UL — SIGNIFICANT CHANGE UP (ref 0–0.9)
MONOCYTES NFR BLD AUTO: 7.3 % — SIGNIFICANT CHANGE UP (ref 2–14)
NEUTROPHILS # BLD AUTO: 2.62 K/UL — SIGNIFICANT CHANGE UP (ref 1.8–7.4)
NEUTROPHILS NFR BLD AUTO: 51.6 % — SIGNIFICANT CHANGE UP (ref 43–77)
NITRITE UR-MCNC: POSITIVE
NRBC # BLD: 0 /100 WBCS — SIGNIFICANT CHANGE UP (ref 0–0)
PH UR: 5 — SIGNIFICANT CHANGE UP (ref 5–8)
PLATELET # BLD AUTO: 279 K/UL — SIGNIFICANT CHANGE UP (ref 150–400)
POTASSIUM SERPL-MCNC: 4.2 MMOL/L — SIGNIFICANT CHANGE UP (ref 3.5–5.3)
POTASSIUM SERPL-SCNC: 4.2 MMOL/L — SIGNIFICANT CHANGE UP (ref 3.5–5.3)
PROT SERPL-MCNC: 6.8 GM/DL — SIGNIFICANT CHANGE UP (ref 6–8.3)
PROT UR-MCNC: NEGATIVE MG/DL — SIGNIFICANT CHANGE UP
RBC # BLD: 3.73 M/UL — LOW (ref 3.8–5.2)
RBC # FLD: 13.4 % — SIGNIFICANT CHANGE UP (ref 10.3–14.5)
SODIUM SERPL-SCNC: 140 MMOL/L — SIGNIFICANT CHANGE UP (ref 135–145)
SP GR SPEC: 1 — LOW (ref 1.01–1.02)
UROBILINOGEN FLD QL: 8 MG/DL
WBC # BLD: 5.07 K/UL — SIGNIFICANT CHANGE UP (ref 3.8–10.5)
WBC # FLD AUTO: 5.07 K/UL — SIGNIFICANT CHANGE UP (ref 3.8–10.5)
WBC UR QL: SIGNIFICANT CHANGE UP

## 2020-07-07 PROCEDURE — 74177 CT ABD & PELVIS W/CONTRAST: CPT | Mod: 26

## 2020-07-07 PROCEDURE — 99285 EMERGENCY DEPT VISIT HI MDM: CPT

## 2020-07-07 RX ORDER — SODIUM CHLORIDE 9 MG/ML
1000 INJECTION INTRAMUSCULAR; INTRAVENOUS; SUBCUTANEOUS ONCE
Refills: 0 | Status: COMPLETED | OUTPATIENT
Start: 2020-07-07 | End: 2020-07-07

## 2020-07-07 RX ORDER — IOHEXOL 300 MG/ML
30 INJECTION, SOLUTION INTRAVENOUS ONCE
Refills: 0 | Status: DISCONTINUED | OUTPATIENT
Start: 2020-07-07 | End: 2020-07-07

## 2020-07-07 RX ORDER — MORPHINE SULFATE 50 MG/1
4 CAPSULE, EXTENDED RELEASE ORAL ONCE
Refills: 0 | Status: COMPLETED | OUTPATIENT
Start: 2020-07-07 | End: 2020-07-07

## 2020-07-07 RX ADMIN — SODIUM CHLORIDE 1000 MILLILITER(S): 9 INJECTION INTRAMUSCULAR; INTRAVENOUS; SUBCUTANEOUS at 12:28

## 2020-07-07 NOTE — ED PROVIDER NOTE - ATTENDING CONTRIBUTION TO CARE
I have seen the patient with the PA and agree with above examination and assessment and plan with the following addendum:        Focused PE:   General: NAD, alert and oriented  Head: Normocephalic, atraumatic  Eyes: PERRLA, EOMI  Cardiac: RRR, no murmurs, rubs or gallops  Resp: CTA, no wheezes, rales or ronchi  GI: Nondistended, slight RLQ tenderness. Some R CVA tendnerness  MSK: FROM  Neuro: Alert and oriented, no focal deficits. Normal gait  Ext: Non edematous, nontender.    Ddx: ro appendicitis/ uti/ pyelonephritis/ no intermittent sharp pain to suggest torsion.  Plan: Cbc, cmp, ua, ucx, CT abd, reassess

## 2020-07-07 NOTE — ED PROVIDER NOTE - NSFOLLOWUPINSTRUCTIONS_ED_ALL_ED_FT
Continue to take the antibiotics you were prescribed.   You can take ibuprofen 600mg every 6 hours as needed for pain.   Make sure to drink lots of fluids.   Follow up with your primary care doctor in the next 72 hours.     Urinary Tract Infection    A urinary tract infection (UTI) is an infection of any part of the urinary tract, which includes the kidneys, ureters, bladder, and urethra. Risk factors include ignoring your need to urinate, wiping back to front if female, being an uncircumcised male, and having diabetes or a weak immune system. Symptoms include frequent urination, pain or burning with urination, foul smelling urine, cloudy urine, pain in the lower abdomen, blood in the urine, and fever. If you were prescribed an antibiotic medicine, take it as told by your health care provider. Do not stop taking the antibiotic even if you start to feel better.    SEEK IMMEDIATE MEDICAL CARE IF YOU HAVE ANY OF THE FOLLOWING SYMPTOMS: severe back or abdominal pain, fever, inability to keep fluids or medicine down, dizziness/lightheadedness, or a change in mental status.

## 2020-07-07 NOTE — ED PROVIDER NOTE - PHYSICAL EXAMINATION
PE:   GEN: Awake, alert, interactive, NAD, non-toxic appearing.   HEAD AND NECK: NC/AT. Airway patent. Neck supple.   EYES: Clear b/l. PERRL  CARDIAC: RRR. S1, S2. No evident pedal edema.    RESP: Normal respiratory effort with no use of accessory muscles or retractions. Clear throughout on auscultation.  ABD: soft, RLQ tenderness with guarding. No rebound. Mild CVA tenderness on the R.   NEURO: AOx3, CN II-XII grossly intact, no focal deficits.   MSK: Moving all extremities with no apparent deformities.   SKIN: Warm, dry, intact normal color

## 2020-07-07 NOTE — ED PROVIDER NOTE - CLINICAL SUMMARY MEDICAL DECISION MAKING FREE TEXT BOX
36yo female with no sig pmh presents complaining of abdominal pain x 2 days. RLQ pain with guarding. Concerning for appendicitis vs pyleo. Will get labs, CT. If CT neg, consider US.

## 2020-07-07 NOTE — ED PROVIDER NOTE - PATIENT PORTAL LINK FT
You can access the FollowMyHealth Patient Portal offered by [facility] by registering at the following website: [email or web address]. By joining [email or web address]’s FollowMyHealth portal, you will also be able to view your health information using other applications (apps) compatible with our system.

## 2020-07-07 NOTE — ED ADULT TRIAGE NOTE - CHIEF COMPLAINT QUOTE
patient BIBA c/o of RLQ abdominal pain started yesterday denied dysuria no blood in urine , denied N/V , c/o of constipation, patient is on antibiotics

## 2020-07-07 NOTE — ED ADULT NURSE NOTE - CHPI ED NUR SYMPTOMS NEG
no diarrhea/no nausea/no dysuria/no blood in stool/no fever/no chills/no vomiting/no abdominal distension/no hematuria/no burning urination

## 2020-07-07 NOTE — PATIENT PROFILE ADULT - ANY SIGNIFICANT CHANGE IN ABILITY TO PERFORM THE FOLLOWING ADL SINCE THE ONSET OF PRESENT ILLNESS?
-on arrival ST elevations on EKG with normal troponin  -repeat EKG with resolution of ST abnormalities  -Cardiology consulted and presentation likely demand ischemia due to neurologic issue that led to temporary EKG changes  -continue medical management   no

## 2020-07-08 DIAGNOSIS — R10.31 RIGHT LOWER QUADRANT PAIN: ICD-10-CM

## 2020-07-08 DIAGNOSIS — D64.9 ANEMIA, UNSPECIFIED: ICD-10-CM

## 2020-07-08 DIAGNOSIS — N39.0 URINARY TRACT INFECTION, SITE NOT SPECIFIED: ICD-10-CM

## 2020-07-08 LAB
CULTURE RESULTS: NO GROWTH — SIGNIFICANT CHANGE UP
SPECIMEN SOURCE: SIGNIFICANT CHANGE UP

## 2020-07-13 PROBLEM — D64.9 ANEMIA, UNSPECIFIED: Chronic | Status: ACTIVE | Noted: 2019-11-29

## 2020-09-08 NOTE — ED PROVIDER NOTE - NEURO NEGATIVE STATEMENT, MLM
Chief Complaint   Patient presents with   • Anxiety     Patient states today his anxiety has flaired up after a couple confrontations with other residents at the group home.   • Psychiatric Evaluation     Angry outbursts at other residents in the group home with visual and auditory hallucinations (chronic problem). He is compliant with his medications     Patient brought from group home for the above complaints. The patient is currently denying SI/HI and has been pleasant. Primary complaint is feeling anxious. Chart up for ERP.   no loss of consciousness, no gait abnormality, no headache, no sensory deficits, and no weakness.

## 2020-12-10 NOTE — H&P PST ADULT - NS NEC GEN PE MLT EXAM PC
Impression: OCT - OD: Good-No focal thinning; OS: Good-No focal thinning Plan: No focal thinning OU No bruits; no thyromegaly or nodules

## 2021-03-17 NOTE — DISCHARGE NOTE PROVIDER - DISCHARGE DATE
01-Dec-2019 [Takes medication as prescribed] : takes [None] : Patient does not have any barriers to medication adherence

## 2022-02-03 ENCOUNTER — EMERGENCY (EMERGENCY)
Facility: HOSPITAL | Age: 37
LOS: 0 days | Discharge: HOME | End: 2022-02-04
Attending: STUDENT IN AN ORGANIZED HEALTH CARE EDUCATION/TRAINING PROGRAM
Payer: COMMERCIAL

## 2022-02-03 VITALS
DIASTOLIC BLOOD PRESSURE: 77 MMHG | WEIGHT: 162.04 LBS | SYSTOLIC BLOOD PRESSURE: 121 MMHG | RESPIRATION RATE: 18 BRPM | TEMPERATURE: 98 F | HEIGHT: 68 IN | HEART RATE: 81 BPM | OXYGEN SATURATION: 100 %

## 2022-02-03 DIAGNOSIS — D25.9 LEIOMYOMA OF UTERUS, UNSPECIFIED: ICD-10-CM

## 2022-02-03 DIAGNOSIS — Z20.822 CONTACT WITH AND (SUSPECTED) EXPOSURE TO COVID-19: ICD-10-CM

## 2022-02-03 DIAGNOSIS — Z96.7 PRESENCE OF OTHER BONE AND TENDON IMPLANTS: Chronic | ICD-10-CM

## 2022-02-03 DIAGNOSIS — R35.0 FREQUENCY OF MICTURITION: ICD-10-CM

## 2022-02-03 DIAGNOSIS — R50.9 FEVER, UNSPECIFIED: ICD-10-CM

## 2022-02-03 DIAGNOSIS — R10.12 LEFT UPPER QUADRANT PAIN: ICD-10-CM

## 2022-02-03 DIAGNOSIS — R10.84 GENERALIZED ABDOMINAL PAIN: ICD-10-CM

## 2022-02-03 LAB
ALBUMIN SERPL ELPH-MCNC: 3.7 G/DL — SIGNIFICANT CHANGE UP (ref 3.3–5)
ALP SERPL-CCNC: 64 U/L — SIGNIFICANT CHANGE UP (ref 40–120)
ALT FLD-CCNC: 21 U/L — SIGNIFICANT CHANGE UP (ref 12–78)
ANION GAP SERPL CALC-SCNC: 1 MMOL/L — LOW (ref 5–17)
APPEARANCE UR: CLEAR — SIGNIFICANT CHANGE UP
AST SERPL-CCNC: 14 U/L — LOW (ref 15–37)
BASOPHILS # BLD AUTO: 0.03 K/UL — SIGNIFICANT CHANGE UP (ref 0–0.2)
BASOPHILS NFR BLD AUTO: 0.4 % — SIGNIFICANT CHANGE UP (ref 0–2)
BILIRUB SERPL-MCNC: 0.2 MG/DL — SIGNIFICANT CHANGE UP (ref 0.2–1.2)
BILIRUB UR-MCNC: NEGATIVE — SIGNIFICANT CHANGE UP
BLD GP AB SCN SERPL QL: SIGNIFICANT CHANGE UP
BUN SERPL-MCNC: 9 MG/DL — SIGNIFICANT CHANGE UP (ref 7–23)
CALCIUM SERPL-MCNC: 8.7 MG/DL — SIGNIFICANT CHANGE UP (ref 8.5–10.1)
CHLORIDE SERPL-SCNC: 109 MMOL/L — HIGH (ref 96–108)
CO2 SERPL-SCNC: 28 MMOL/L — SIGNIFICANT CHANGE UP (ref 22–31)
COLOR SPEC: YELLOW — SIGNIFICANT CHANGE UP
CREAT SERPL-MCNC: 0.79 MG/DL — SIGNIFICANT CHANGE UP (ref 0.5–1.3)
DIFF PNL FLD: NEGATIVE — SIGNIFICANT CHANGE UP
EOSINOPHIL # BLD AUTO: 0.13 K/UL — SIGNIFICANT CHANGE UP (ref 0–0.5)
EOSINOPHIL NFR BLD AUTO: 1.9 % — SIGNIFICANT CHANGE UP (ref 0–6)
FLUAV AG NPH QL: SIGNIFICANT CHANGE UP
FLUBV AG NPH QL: SIGNIFICANT CHANGE UP
GLUCOSE SERPL-MCNC: 80 MG/DL — SIGNIFICANT CHANGE UP (ref 70–99)
GLUCOSE UR QL: NEGATIVE MG/DL — SIGNIFICANT CHANGE UP
HCG SERPL-ACNC: <1 MIU/ML — SIGNIFICANT CHANGE UP
HCT VFR BLD CALC: 32.4 % — LOW (ref 34.5–45)
HGB BLD-MCNC: 10 G/DL — LOW (ref 11.5–15.5)
IMM GRANULOCYTES NFR BLD AUTO: 0.3 % — SIGNIFICANT CHANGE UP (ref 0–1.5)
KETONES UR-MCNC: NEGATIVE — SIGNIFICANT CHANGE UP
LEUKOCYTE ESTERASE UR-ACNC: NEGATIVE — SIGNIFICANT CHANGE UP
LIDOCAIN IGE QN: 84 U/L — SIGNIFICANT CHANGE UP (ref 73–393)
LYMPHOCYTES # BLD AUTO: 2.21 K/UL — SIGNIFICANT CHANGE UP (ref 1–3.3)
LYMPHOCYTES # BLD AUTO: 31.8 % — SIGNIFICANT CHANGE UP (ref 13–44)
MCHC RBC-ENTMCNC: 26.2 PG — LOW (ref 27–34)
MCHC RBC-ENTMCNC: 30.9 G/DL — LOW (ref 32–36)
MCV RBC AUTO: 85 FL — SIGNIFICANT CHANGE UP (ref 80–100)
MONOCYTES # BLD AUTO: 0.45 K/UL — SIGNIFICANT CHANGE UP (ref 0–0.9)
MONOCYTES NFR BLD AUTO: 6.5 % — SIGNIFICANT CHANGE UP (ref 2–14)
NEUTROPHILS # BLD AUTO: 4.11 K/UL — SIGNIFICANT CHANGE UP (ref 1.8–7.4)
NEUTROPHILS NFR BLD AUTO: 59.1 % — SIGNIFICANT CHANGE UP (ref 43–77)
NITRITE UR-MCNC: NEGATIVE — SIGNIFICANT CHANGE UP
NRBC # BLD: 0 /100 WBCS — SIGNIFICANT CHANGE UP (ref 0–0)
PH UR: 6 — SIGNIFICANT CHANGE UP (ref 5–8)
PLATELET # BLD AUTO: 353 K/UL — SIGNIFICANT CHANGE UP (ref 150–400)
POTASSIUM SERPL-MCNC: 3.8 MMOL/L — SIGNIFICANT CHANGE UP (ref 3.5–5.3)
POTASSIUM SERPL-SCNC: 3.8 MMOL/L — SIGNIFICANT CHANGE UP (ref 3.5–5.3)
PROT SERPL-MCNC: 7.5 GM/DL — SIGNIFICANT CHANGE UP (ref 6–8.3)
PROT UR-MCNC: NEGATIVE MG/DL — SIGNIFICANT CHANGE UP
RBC # BLD: 3.81 M/UL — SIGNIFICANT CHANGE UP (ref 3.8–5.2)
RBC # FLD: 13.7 % — SIGNIFICANT CHANGE UP (ref 10.3–14.5)
SARS-COV-2 RNA SPEC QL NAA+PROBE: SIGNIFICANT CHANGE UP
SODIUM SERPL-SCNC: 138 MMOL/L — SIGNIFICANT CHANGE UP (ref 135–145)
SP GR SPEC: 1.01 — SIGNIFICANT CHANGE UP (ref 1.01–1.02)
UROBILINOGEN FLD QL: NEGATIVE MG/DL — SIGNIFICANT CHANGE UP
WBC # BLD: 6.95 K/UL — SIGNIFICANT CHANGE UP (ref 3.8–10.5)
WBC # FLD AUTO: 6.95 K/UL — SIGNIFICANT CHANGE UP (ref 3.8–10.5)

## 2022-02-03 PROCEDURE — 74177 CT ABD & PELVIS W/CONTRAST: CPT | Mod: 26,MC

## 2022-02-03 PROCEDURE — 99285 EMERGENCY DEPT VISIT HI MDM: CPT

## 2022-02-03 RX ORDER — IBUPROFEN 200 MG
1 TABLET ORAL
Qty: 30 | Refills: 0
Start: 2022-02-03 | End: 2022-02-07

## 2022-02-03 RX ORDER — SODIUM CHLORIDE 9 MG/ML
1000 INJECTION INTRAMUSCULAR; INTRAVENOUS; SUBCUTANEOUS ONCE
Refills: 0 | Status: COMPLETED | OUTPATIENT
Start: 2022-02-03 | End: 2022-02-03

## 2022-02-03 RX ORDER — KETOROLAC TROMETHAMINE 30 MG/ML
15 SYRINGE (ML) INJECTION ONCE
Refills: 0 | Status: DISCONTINUED | OUTPATIENT
Start: 2022-02-03 | End: 2022-02-03

## 2022-02-03 RX ADMIN — Medication 15 MILLIGRAM(S): at 22:26

## 2022-02-03 RX ADMIN — SODIUM CHLORIDE 1000 MILLILITER(S): 9 INJECTION INTRAMUSCULAR; INTRAVENOUS; SUBCUTANEOUS at 22:26

## 2022-02-03 NOTE — ED ADULT TRIAGE NOTE - CHIEF COMPLAINT QUOTE
as per patient c/o generalized abdominal and frequency for past 2 days. . LMP 1/21. Hx: Anemia. Denies dysuria, dizziness, N/V/D. as per patient c/o  abdominal pain and frequency for past 2 days. States the pain is all over, however initially started as LLQ pain. LMP 1/21. Hx: Anemia. Denies dysuria, dizziness, N/V/D.

## 2022-02-03 NOTE — ED PROVIDER NOTE - CLINICAL SUMMARY MEDICAL DECISION MAKING FREE TEXT BOX
36F w/ PMH anemia pw diffuse abd pain, L flank pain a/w urinary frequency x 2 days. AFVSS. Pt appears uncomfortable, but otherwise well appearing. Plan: CT renal stone hunt, UA/C, CBC, CMP, HCG, lipase, T&S. Give Toradol, IVF. Re-eval. 36F w/ PMH anemia pw diffuse burning, sharp abd pain, L flank pain a/w urinary frequency x 2 days. AFVSS. Pt appears uncomfortable, but otherwise well appearing. Plan: CT AP w/ contrast, UA/C, CBC, CMP, HCG, lipase, T&S. Give Toradol, IVF. Re-eval.

## 2022-02-03 NOTE — ED ADULT NURSE NOTE - OBJECTIVE STATEMENT
Pt presents to ed a&ox4 breathing spont/unlabored to RA. c/o abd pain x 2 days , denies nausea vomiting or diarrhea. never had this pain before. LMP 1/21 hx of anemia

## 2022-02-03 NOTE — ED ADULT NURSE NOTE - ED STAT RN HANDOFF DETAILS
Report received from KAREEN akhtar at 11pm. Assessment available on [facility]. will continue to monitor

## 2022-02-03 NOTE — ED PROVIDER NOTE - PATIENT PORTAL LINK FT
You can access the FollowMyHealth Patient Portal offered by [facility] by registering at the following website: [email or web address]. By joining [employer]’s FollowMyHealth portal, you will also be able to view your health information using other applications (apps) compatible with our system.

## 2022-02-03 NOTE — ED PROVIDER NOTE - PROGRESS NOTE DETAILS
W/u w/o significant abnormalities, CT w/ + fibroid uterus. Pt w/ improvement in symptoms w/ ED meds. Stable for d/c home. Given script Motrin, Bentyl. Given and instructed for outpatient PCP, GYN f/u. Return signs / symptoms d/w pt. She understands / agrees w/ this plan.

## 2022-02-03 NOTE — ED PROVIDER NOTE - PHYSICAL EXAMINATION
GEN: Awake, alert, interactive, NAD.  HEAD AND NECK: NC/AT. Airway patent. Neck supple.   EYES:  Clear b/l. EOMI. PERRL.   ENT: Moist mucus membranes.   CARDIAC: Regular rate, regular rhythm. No evident pedal edema.    RESP/CHEST: Normal respiratory effort with no use of accessory muscles or retractions. Clear throughout on auscultation.  ABD: Soft, non-distended, + diffusely TTP. No rebound, no guarding.   BACK: No midline spinal TTP. + L CVAT.   EXTREMITIES: Moving all extremities with no apparent deformities.   SKIN: Warm, dry, intact normal color. No rash.   NEURO: AOx3, CN II-XII grossly intact, no focal deficits.   PSYCH: Appropriate mood and affect. GEN: Awake, alert, interactive, appears uncomfortable / in pain.   HEAD AND NECK: NC/AT. Airway patent. Neck supple.   EYES:  Clear b/l. EOMI. PERRL.   ENT: Moist mucus membranes.   CARDIAC: Regular rate, regular rhythm. No evident pedal edema.    RESP/CHEST: Normal respiratory effort with no use of accessory muscles or retractions. Clear throughout on auscultation.  ABD: Soft, non-distended, + diffusely TTP. No rebound, no guarding.   BACK: No midline spinal TTP. + L CVAT.   EXTREMITIES: Moving all extremities with no apparent deformities.   SKIN: Warm, dry, intact normal color. No rash.   NEURO: AOx3, CN II-XII grossly intact, no focal deficits.   PSYCH: Appropriate mood and affect.

## 2022-02-03 NOTE — ED PROVIDER NOTE - OBJECTIVE STATEMENT
36F pw 2 days diffuse abd pain, worsening. Initially thought 2/2 recent menses. Pt describes as sharp, burning pain. Pt pacing around ED room. Denies previous similar pain. Pt taking Z-jatin w/o relief. Pt reports onset urinary frequency, went to UC b/c thought could be UTI, told urine was clean, but could be related to ovaries. Reports + subjective fever / chills this AM. + L flank pain. Denies N/V, CP, SOB, cough, dysuria, hematuria, diarrhea, constipation, vaginal bleeding or d/c.     PMH anemia, PSH none, NKDA, no meds. LMP 1/21. 36F pw 2 days diffuse abd pain, worsening. Initially thought 2/2 recent menses. Pt describes as sharp, burning pain. Denies previous similar pain. Pt reports onset urinary frequency, went to UC b/c thought symptoms could be UTI, told urine was negative for infection, but that pain could be related to ovaries. Pt taking Z-jatin w/o relief. Reports + subjective fever / chills this AM. + L flank pain. Denies N/V, CP, SOB, cough, dysuria, hematuria, diarrhea, constipation, vaginal bleeding or d/c.     PMH anemia, PSH none, NKDA, no meds. LMP 1/21.

## 2022-02-03 NOTE — ED ADULT NURSE NOTE - CHIEF COMPLAINT QUOTE
as per patient c/o  abdominal pain and frequency for past 2 days. States the pain is all over, however initially started as LLQ pain. LMP 1/21. Hx: Anemia. Denies dysuria, dizziness, N/V/D.

## 2022-02-04 VITALS
SYSTOLIC BLOOD PRESSURE: 117 MMHG | HEART RATE: 62 BPM | OXYGEN SATURATION: 100 % | RESPIRATION RATE: 18 BRPM | TEMPERATURE: 98 F | DIASTOLIC BLOOD PRESSURE: 72 MMHG

## 2022-02-04 LAB
CULTURE RESULTS: SIGNIFICANT CHANGE UP
SPECIMEN SOURCE: SIGNIFICANT CHANGE UP

## 2022-04-23 ENCOUNTER — EMERGENCY (EMERGENCY)
Facility: HOSPITAL | Age: 37
LOS: 0 days | Discharge: HOME | End: 2022-04-23
Attending: EMERGENCY MEDICINE
Payer: COMMERCIAL

## 2022-04-23 VITALS
SYSTOLIC BLOOD PRESSURE: 110 MMHG | DIASTOLIC BLOOD PRESSURE: 54 MMHG | TEMPERATURE: 98 F | OXYGEN SATURATION: 100 % | RESPIRATION RATE: 18 BRPM | HEART RATE: 83 BPM

## 2022-04-23 VITALS
WEIGHT: 164.02 LBS | OXYGEN SATURATION: 100 % | TEMPERATURE: 98 F | SYSTOLIC BLOOD PRESSURE: 112 MMHG | DIASTOLIC BLOOD PRESSURE: 72 MMHG | RESPIRATION RATE: 15 BRPM | HEIGHT: 68 IN | HEART RATE: 80 BPM

## 2022-04-23 DIAGNOSIS — Z20.822 CONTACT WITH AND (SUSPECTED) EXPOSURE TO COVID-19: ICD-10-CM

## 2022-04-23 DIAGNOSIS — R53.83 OTHER FATIGUE: ICD-10-CM

## 2022-04-23 DIAGNOSIS — R51.9 HEADACHE, UNSPECIFIED: ICD-10-CM

## 2022-04-23 DIAGNOSIS — D64.9 ANEMIA, UNSPECIFIED: ICD-10-CM

## 2022-04-23 DIAGNOSIS — Z96.7 PRESENCE OF OTHER BONE AND TENDON IMPLANTS: Chronic | ICD-10-CM

## 2022-04-23 LAB
ALBUMIN SERPL ELPH-MCNC: 3.6 G/DL — SIGNIFICANT CHANGE UP (ref 3.3–5)
ALP SERPL-CCNC: 57 U/L — SIGNIFICANT CHANGE UP (ref 40–120)
ALT FLD-CCNC: 28 U/L — SIGNIFICANT CHANGE UP (ref 12–78)
ANION GAP SERPL CALC-SCNC: 6 MMOL/L — SIGNIFICANT CHANGE UP (ref 5–17)
APTT BLD: 31.2 SEC — SIGNIFICANT CHANGE UP (ref 27.5–35.5)
AST SERPL-CCNC: 20 U/L — SIGNIFICANT CHANGE UP (ref 15–37)
BASOPHILS # BLD AUTO: 0.03 K/UL — SIGNIFICANT CHANGE UP (ref 0–0.2)
BASOPHILS NFR BLD AUTO: 0.6 % — SIGNIFICANT CHANGE UP (ref 0–2)
BILIRUB SERPL-MCNC: 0.2 MG/DL — SIGNIFICANT CHANGE UP (ref 0.2–1.2)
BLD GP AB SCN SERPL QL: SIGNIFICANT CHANGE UP
BUN SERPL-MCNC: 11 MG/DL — SIGNIFICANT CHANGE UP (ref 7–23)
CALCIUM SERPL-MCNC: 9.5 MG/DL — SIGNIFICANT CHANGE UP (ref 8.5–10.1)
CHLORIDE SERPL-SCNC: 110 MMOL/L — HIGH (ref 96–108)
CO2 SERPL-SCNC: 26 MMOL/L — SIGNIFICANT CHANGE UP (ref 22–31)
CREAT SERPL-MCNC: 0.65 MG/DL — SIGNIFICANT CHANGE UP (ref 0.5–1.3)
EGFR: 116 ML/MIN/1.73M2 — SIGNIFICANT CHANGE UP
EOSINOPHIL # BLD AUTO: 0.09 K/UL — SIGNIFICANT CHANGE UP (ref 0–0.5)
EOSINOPHIL NFR BLD AUTO: 1.7 % — SIGNIFICANT CHANGE UP (ref 0–6)
FLUAV AG NPH QL: SIGNIFICANT CHANGE UP
FLUBV AG NPH QL: SIGNIFICANT CHANGE UP
GLUCOSE SERPL-MCNC: 85 MG/DL — SIGNIFICANT CHANGE UP (ref 70–99)
HCG SERPL-ACNC: <1 MIU/ML — SIGNIFICANT CHANGE UP
HCT VFR BLD CALC: 29.8 % — LOW (ref 34.5–45)
HGB BLD-MCNC: 9.1 G/DL — LOW (ref 11.5–15.5)
IMM GRANULOCYTES NFR BLD AUTO: 0.4 % — SIGNIFICANT CHANGE UP (ref 0–1.5)
INR BLD: 1.01 RATIO — SIGNIFICANT CHANGE UP (ref 0.88–1.16)
LYMPHOCYTES # BLD AUTO: 1.5 K/UL — SIGNIFICANT CHANGE UP (ref 1–3.3)
LYMPHOCYTES # BLD AUTO: 27.9 % — SIGNIFICANT CHANGE UP (ref 13–44)
MCHC RBC-ENTMCNC: 24.9 PG — LOW (ref 27–34)
MCHC RBC-ENTMCNC: 30.5 G/DL — LOW (ref 32–36)
MCV RBC AUTO: 81.4 FL — SIGNIFICANT CHANGE UP (ref 80–100)
MONOCYTES # BLD AUTO: 0.52 K/UL — SIGNIFICANT CHANGE UP (ref 0–0.9)
MONOCYTES NFR BLD AUTO: 9.7 % — SIGNIFICANT CHANGE UP (ref 2–14)
NEUTROPHILS # BLD AUTO: 3.21 K/UL — SIGNIFICANT CHANGE UP (ref 1.8–7.4)
NEUTROPHILS NFR BLD AUTO: 59.7 % — SIGNIFICANT CHANGE UP (ref 43–77)
NRBC # BLD: 0 /100 WBCS — SIGNIFICANT CHANGE UP (ref 0–0)
PLATELET # BLD AUTO: 334 K/UL — SIGNIFICANT CHANGE UP (ref 150–400)
POTASSIUM SERPL-MCNC: 3.9 MMOL/L — SIGNIFICANT CHANGE UP (ref 3.5–5.3)
POTASSIUM SERPL-SCNC: 3.9 MMOL/L — SIGNIFICANT CHANGE UP (ref 3.5–5.3)
PROT SERPL-MCNC: 7.5 GM/DL — SIGNIFICANT CHANGE UP (ref 6–8.3)
PROTHROM AB SERPL-ACNC: 12.1 SEC — SIGNIFICANT CHANGE UP (ref 10.5–13.4)
RBC # BLD: 3.66 M/UL — LOW (ref 3.8–5.2)
RBC # FLD: 15.5 % — HIGH (ref 10.3–14.5)
SARS-COV-2 RNA SPEC QL NAA+PROBE: SIGNIFICANT CHANGE UP
SODIUM SERPL-SCNC: 142 MMOL/L — SIGNIFICANT CHANGE UP (ref 135–145)
WBC # BLD: 5.37 K/UL — SIGNIFICANT CHANGE UP (ref 3.8–10.5)
WBC # FLD AUTO: 5.37 K/UL — SIGNIFICANT CHANGE UP (ref 3.8–10.5)

## 2022-04-23 PROCEDURE — 93010 ELECTROCARDIOGRAM REPORT: CPT

## 2022-04-23 PROCEDURE — 99285 EMERGENCY DEPT VISIT HI MDM: CPT

## 2022-04-23 RX ORDER — SODIUM CHLORIDE 9 MG/ML
1000 INJECTION INTRAMUSCULAR; INTRAVENOUS; SUBCUTANEOUS ONCE
Refills: 0 | Status: COMPLETED | OUTPATIENT
Start: 2022-04-23 | End: 2022-04-23

## 2022-04-23 RX ADMIN — SODIUM CHLORIDE 1000 MILLILITER(S): 9 INJECTION INTRAMUSCULAR; INTRAVENOUS; SUBCUTANEOUS at 11:30

## 2022-04-23 RX ADMIN — SODIUM CHLORIDE 1000 MILLILITER(S): 9 INJECTION INTRAMUSCULAR; INTRAVENOUS; SUBCUTANEOUS at 12:30

## 2022-04-23 NOTE — ED PROVIDER NOTE - PROGRESS NOTE DETAILS
Pt remains very comfortable denies dizziness sob, chest pain nausea, vomiting abd pain Pt is advised to follow up with her ob/gyn and return if symptoms persist or worsen pt sts she is having vaginal bleeding now. Pt sts she is currently taking iron pills for hx of anemia.

## 2022-04-23 NOTE — ED ADULT TRIAGE NOTE - CHIEF COMPLAINT QUOTE
Sent  by pmd for low hemoglobin of 8, h/o anemia secondary to heavy menses. Reports feeling tired and headache sometimes.

## 2022-04-23 NOTE — ED ADULT NURSE NOTE - NSICDXPASTMEDICALHX_GEN_ALL_CORE_FT
PAST MEDICAL HISTORY:  Anemia     Clavicle fracture ' 2009  Left. surgically treated    Endometriosis     Menorrhagia with regular cycle     Tenosynovitis of hand or wrist Right    Uterine fibroid

## 2022-04-23 NOTE — ED PROVIDER NOTE - CONSTITUTIONAL, MLM
normal... Well appearing, awake, alert, oriented to person, place, time/situation and in no apparent distress. Speaking in clear full sentences no nasal flairing no shoulders retractions no diaphoresis smiling appears very comfortable sitting up in the stretcher in a bright light room

## 2022-04-23 NOTE — ED PROVIDER NOTE - OBJECTIVE STATEMENT
36 years old female sent by her ob/gyn for hemoglobin of 8 done 5 days ago. Pt c/o fatigue headache. Pt has a hx of uterine fibroid with 10 to 12 days of prolong menstrual cycle last was 4/3/22. Pt denies blurred visions, light sensitivities, focal/distal weakness or numbness, neck/back/calfs pain, cough, sob, chest pain, nausea, vomiting, fever, chills, abd pain, dysuria, hematuria or irregular bowel movements.

## 2022-04-23 NOTE — ED ADULT NURSE NOTE - OBJECTIVE STATEMENT
55 Pt sent to ED for low hgb per outpatient labs. Pt states she had labs done 5 days ago at her obgyn's office. Pt reports mild headache, fatigue, and mild chest pain which she states are her typical s/s when her hgb is low. Pt reports hx of uterine fibroids and menorrhagia. Menses usually lasts between 10-12 days every month. Pt states there is a chance that she could be pregnant. Pt denies hx of miscarriages. LMP 4/3/22. Pt denies SOB, dizziness, palpitations, bloody stools, hematuria, cough, fever/chills, N/V/D, numbness/tingling. 12 lead EKG completed. Respirations even and unlabored. Pt is able to make all needs known.

## 2022-04-23 NOTE — ED PROVIDER NOTE - PATIENT PORTAL LINK FT
You can access the FollowMyHealth Patient Portal offered by [facility] by registering at the following website: [email or web address]. By joining [employer]s FollowMyHealth portal, you will also be able to view your health information using other applications (apps) compatible with our system.

## 2022-04-23 NOTE — ED PROVIDER NOTE - IV ALTEPLASE EXCL ABS HIDDEN
show

## 2022-04-23 NOTE — ED ADULT NURSE NOTE - NS PRO PASSIVE SMOKE EXP
- Follow up chest xray in 5 weeks.  You don't need an appointment for this.  - Get your potassium drawn when you get Dr. Manrique's labs drawn.  - Notify me if your breathing gets worse.   - Call to find out if other inhalers are covered (other than spiriva, advair).  Maybe a different type of inhaler would make you cough less.      [facility]    If you have any questions regarding to your visit please contact your care team:     Team Pink:   Clinic Hours Telephone Number   Internal Medicine:  Dr. Ce Stafford, NP       7am-7pm  Monday - Thursday   7am-5pm  Fridays  [phone]  (Appointment scheduling available 24/7)    Questions about your visit?  Team Line  [phone]   Urgent Care - [place] [facility] - 11am-9pm Monday-Friday Saturday-Sunday- 9am-5pm   [place] - 5pm-9pm Monday-Friday Saturday-Sunday- 9am-5pm  [phone] - [place]   [phone] - [place]       What options do I have for visits at the clinic other than the traditional office visit?  To expand how we care for you, many of our providers are utilizing electronic visits (e-visits) and telephone visits, when medically appropriate, for interactions with their patients rather than a visit in the clinic.   We also offer nurse visits for many medical concerns. Just like any other service, we will bill your insurance company for this type of visit based on time spent on the phone with your provider. Not all insurance companies cover these visits. Please check with your medical insurance if this type of visit is covered. You will be responsible for any charges that are not paid by your insurance.      E-visits via [employer]:  generally incur a $35.00 fee.  Telephone visits:  Time spent on the phone: *charged based on time that is spent on the phone in increments of 10 minutes. Estimated cost:   5-10 mins $30.00   11-20 mins. $59.00   21-30 mins. $85.00   Use [employer] (secure email  communication and access to your chart) to send your primary care provider a message or make an appointment. Ask someone on your Team how to sign up for [employer].    For a Price Quote for your services, please call our Consumer Price Line at [phone].    As always, Thank you for trusting us with your health care needs!    Rukhsana Cobain, CMA     No

## 2022-04-23 NOTE — ED PROVIDER NOTE - NSICDXPASTMEDICALHX_GEN_ALL_CORE_FT
PAST MEDICAL HISTORY:  Anemia     Clavicle fracture ' 2009  Left. surgically treated    Endometriosis     Tenosynovitis of hand or wrist Right    Uterine fibroid

## 2022-08-15 ENCOUNTER — EMERGENCY (EMERGENCY)
Facility: HOSPITAL | Age: 37
LOS: 0 days | Discharge: HOME | End: 2022-08-16
Attending: STUDENT IN AN ORGANIZED HEALTH CARE EDUCATION/TRAINING PROGRAM

## 2022-08-15 VITALS
RESPIRATION RATE: 20 BRPM | WEIGHT: 171.96 LBS | TEMPERATURE: 98 F | OXYGEN SATURATION: 97 % | HEART RATE: 72 BPM | DIASTOLIC BLOOD PRESSURE: 83 MMHG | HEIGHT: 68 IN | SYSTOLIC BLOOD PRESSURE: 117 MMHG

## 2022-08-15 DIAGNOSIS — D64.9 ANEMIA, UNSPECIFIED: ICD-10-CM

## 2022-08-15 DIAGNOSIS — Z88.1 ALLERGY STATUS TO OTHER ANTIBIOTIC AGENTS STATUS: ICD-10-CM

## 2022-08-15 DIAGNOSIS — N80.9 ENDOMETRIOSIS, UNSPECIFIED: ICD-10-CM

## 2022-08-15 DIAGNOSIS — R10.31 RIGHT LOWER QUADRANT PAIN: ICD-10-CM

## 2022-08-15 DIAGNOSIS — Z96.7 PRESENCE OF OTHER BONE AND TENDON IMPLANTS: Chronic | ICD-10-CM

## 2022-08-15 LAB
BASOPHILS # BLD AUTO: 0.03 K/UL — SIGNIFICANT CHANGE UP (ref 0–0.2)
BASOPHILS NFR BLD AUTO: 0.5 % — SIGNIFICANT CHANGE UP (ref 0–2)
EOSINOPHIL # BLD AUTO: 0.18 K/UL — SIGNIFICANT CHANGE UP (ref 0–0.5)
EOSINOPHIL NFR BLD AUTO: 3.2 % — SIGNIFICANT CHANGE UP (ref 0–6)
HCT VFR BLD CALC: 32.2 % — LOW (ref 34.5–45)
HGB BLD-MCNC: 10 G/DL — LOW (ref 11.5–15.5)
IMM GRANULOCYTES NFR BLD AUTO: 0.2 % — SIGNIFICANT CHANGE UP (ref 0–1.5)
LACTATE SERPL-SCNC: 1 MMOL/L — SIGNIFICANT CHANGE UP (ref 0.7–2)
LYMPHOCYTES # BLD AUTO: 1.99 K/UL — SIGNIFICANT CHANGE UP (ref 1–3.3)
LYMPHOCYTES # BLD AUTO: 35.6 % — SIGNIFICANT CHANGE UP (ref 13–44)
MCHC RBC-ENTMCNC: 26.7 PG — LOW (ref 27–34)
MCHC RBC-ENTMCNC: 31.1 G/DL — LOW (ref 32–36)
MCV RBC AUTO: 85.9 FL — SIGNIFICANT CHANGE UP (ref 80–100)
MONOCYTES # BLD AUTO: 0.5 K/UL — SIGNIFICANT CHANGE UP (ref 0–0.9)
MONOCYTES NFR BLD AUTO: 8.9 % — SIGNIFICANT CHANGE UP (ref 2–14)
NEUTROPHILS # BLD AUTO: 2.88 K/UL — SIGNIFICANT CHANGE UP (ref 1.8–7.4)
NEUTROPHILS NFR BLD AUTO: 51.6 % — SIGNIFICANT CHANGE UP (ref 43–77)
NRBC # BLD: 0 /100 WBCS — SIGNIFICANT CHANGE UP (ref 0–0)
PLATELET # BLD AUTO: 315 K/UL — SIGNIFICANT CHANGE UP (ref 150–400)
RBC # BLD: 3.75 M/UL — LOW (ref 3.8–5.2)
RBC # FLD: 14.3 % — SIGNIFICANT CHANGE UP (ref 10.3–14.5)
WBC # BLD: 5.59 K/UL — SIGNIFICANT CHANGE UP (ref 3.8–10.5)
WBC # FLD AUTO: 5.59 K/UL — SIGNIFICANT CHANGE UP (ref 3.8–10.5)

## 2022-08-15 PROCEDURE — 99285 EMERGENCY DEPT VISIT HI MDM: CPT

## 2022-08-15 RX ORDER — KETOROLAC TROMETHAMINE 30 MG/ML
15 SYRINGE (ML) INJECTION ONCE
Refills: 0 | Status: DISCONTINUED | OUTPATIENT
Start: 2022-08-15 | End: 2022-08-15

## 2022-08-15 RX ORDER — ACETAMINOPHEN 500 MG
650 TABLET ORAL ONCE
Refills: 0 | Status: COMPLETED | OUTPATIENT
Start: 2022-08-15 | End: 2022-08-15

## 2022-08-15 RX ADMIN — Medication 15 MILLIGRAM(S): at 23:18

## 2022-08-15 RX ADMIN — Medication 650 MILLIGRAM(S): at 23:17

## 2022-08-15 NOTE — ED PROVIDER NOTE - NSFOLLOWUPCLINICS_GEN_ALL_ED_FT
[facility]  Gynceology/OB  [address] [place] [address]  Phone: [phone]  Fax:

## 2022-08-15 NOTE — ED ADULT TRIAGE NOTE - MODE OF ARRIVAL
Walk in [employer] Partially impaired: cannot see medication labels or newsprint, but can see obstacles in path, and the surrounding layout; can count fingers at arm's length

## 2022-08-15 NOTE — ED PROVIDER NOTE - ATTENDING CONTRIBUTION TO CARE
36 yo F with a h/o uterine fibroids and anemia who presents with abdominal pain. Last seen in the ED Feb 2022 for similar abd pain. pain started a couple of days ago. no NVDC. no urianry symptoms, no vaginal discahrge. similar to prior episodes of this pain. on exam moderate right adnexal ttp,otherwise unremarkable. good analgesia avchieved in ED, laboratory workup without signifncant abnormaliteis. after discussion with patient about possibility of obtaining imaging studies the imaging studies are declined as the pain is similar to her prior episodes of pain and occuring at the same time of her menstrual cycle.

## 2022-08-15 NOTE — ED ADULT NURSE NOTE - OBJECTIVE STATEMENT
patient aox3. patient denies pmhx. patient with generalized abdominal pain radiating to right lower back x 2 days. She reports some constipation. Last BM yesterday. She denies n/v/d. LMP: 7/31/22

## 2022-08-15 NOTE — ED PROVIDER NOTE - PATIENT PORTAL LINK FT
You can access the FollowMyHealth Patient Portal offered by [facility] by registering at the following website: [email or web address]. By joining [employer]’s FollowMyHealth portal, you will also be able to view your health information using other applications (apps) compatible with our system.

## 2022-08-15 NOTE — ED PROVIDER NOTE - PROGRESS NOTE DETAILS
Patient was re-evaluated and doing well. Results, including any incidental findings, were discussed. Return precautions and follow up were discussed. Patient verbalized understanding.  pt feels better

## 2022-08-15 NOTE — ED PROVIDER NOTE - PHYSICAL EXAMINATION
Jammie Lehman MD  GENERAL: Patient awake alert NAD.  HEENT: NC/AT, Moist mucous membranes, EOMI.  LUNGS: CTAB, no wheezes or crackles.   CARDIAC: RRR, no m/r/g.    ABDOMEN: Soft, +tender suprapubic and RLQ, ND, No rebound, guarding. No CVA tenderness.   EXT: No edema. No calf tenderness.   MSK: No pain with movement, no deformities.  NEURO: A&Ox3. Moving all extremities.  SKIN: Warm and dry. No rash.  PSYCH: Normal affect.

## 2022-08-15 NOTE — ED ADULT TRIAGE NOTE - CHIEF COMPLAINT QUOTE
hx of anemia RLQ R/t R lower x 2 days. denies N/V/D , dysuria. LMP 07/31, LBM yesterday hx of anemia RLQ R/t R lower back x 2 days. denies N/V/D , dysuria. LMP 07/31, LBM yesterday

## 2022-08-15 NOTE — ED PROVIDER NOTE - NS ED ATTENDING STATEMENT MOD
I have seen and examined this patient and fully participated in the care of this patient as the teaching attending.  The service was shared with the SWETHA.  I reviewed and verified the documentation and independently performed the documented:

## 2022-08-15 NOTE — ED PROVIDER NOTE - CLINICAL SUMMARY MEDICAL DECISION MAKING FREE TEXT BOX
Dominik, pgy3 - Pt is a 38 yo F with a h/o uterine fibroids and anemia who presents with abdominal pain. abd exam tender suprapubic and RLQ. ddx: fibroids vs UTI/pyelo. will check labs, urine. pain meds

## 2022-08-15 NOTE — ED PROVIDER NOTE - OBJECTIVE STATEMENT
Pt is a 38 yo F with a h/o uterine fibroids and anemia who presents with abdominal pain. Last seen in the ED Feb 2022 for similar abd pain. Pt notes that every few months she gets intense abd pain but workup has been negative. Today's abd pain started Saturday, sharp, constant, RLQ and radiation to back. BM normal, last BM yesterday. LMP 7/31. No hematuria, dysuria, vaginal discharge, vaginal bleeding. No CP or SOB. No N/V, fever, chills. tolerating po.

## 2022-08-16 ENCOUNTER — EMERGENCY (EMERGENCY)
Facility: HOSPITAL | Age: 37
LOS: 1 days | Discharge: HOME | End: 2022-08-16
Attending: STUDENT IN AN ORGANIZED HEALTH CARE EDUCATION/TRAINING PROGRAM | Admitting: STUDENT IN AN ORGANIZED HEALTH CARE EDUCATION/TRAINING PROGRAM

## 2022-08-16 VITALS
TEMPERATURE: 98 F | HEIGHT: 68 IN | SYSTOLIC BLOOD PRESSURE: 122 MMHG | OXYGEN SATURATION: 100 % | DIASTOLIC BLOOD PRESSURE: 73 MMHG | HEART RATE: 80 BPM | RESPIRATION RATE: 16 BRPM

## 2022-08-16 DIAGNOSIS — Z96.7 PRESENCE OF OTHER BONE AND TENDON IMPLANTS: Chronic | ICD-10-CM

## 2022-08-16 PROBLEM — N92.0 EXCESSIVE AND FREQUENT MENSTRUATION WITH REGULAR CYCLE: Chronic | Status: ACTIVE | Noted: 2022-04-23

## 2022-08-16 PROBLEM — N80.9 ENDOMETRIOSIS, UNSPECIFIED: Chronic | Status: ACTIVE | Noted: 2022-04-23

## 2022-08-16 PROBLEM — D25.9 LEIOMYOMA OF UTERUS, UNSPECIFIED: Chronic | Status: ACTIVE | Noted: 2022-04-23

## 2022-08-16 LAB
ALBUMIN SERPL ELPH-MCNC: 3.4 G/DL — SIGNIFICANT CHANGE UP (ref 3.3–5)
ALP SERPL-CCNC: 65 U/L — SIGNIFICANT CHANGE UP (ref 40–120)
ALT FLD-CCNC: 18 U/L — SIGNIFICANT CHANGE UP (ref 12–78)
ANION GAP SERPL CALC-SCNC: 2 MMOL/L — LOW (ref 5–17)
APPEARANCE UR: CLEAR — SIGNIFICANT CHANGE UP
AST SERPL-CCNC: 13 U/L — LOW (ref 15–37)
BACTERIA # UR AUTO: ABNORMAL
BILIRUB SERPL-MCNC: 0.2 MG/DL — SIGNIFICANT CHANGE UP (ref 0.2–1.2)
BILIRUB UR-MCNC: NEGATIVE — SIGNIFICANT CHANGE UP
BUN SERPL-MCNC: 8 MG/DL — SIGNIFICANT CHANGE UP (ref 7–23)
CALCIUM SERPL-MCNC: 9.2 MG/DL — SIGNIFICANT CHANGE UP (ref 8.5–10.1)
CHLORIDE SERPL-SCNC: 110 MMOL/L — HIGH (ref 96–108)
CO2 SERPL-SCNC: 29 MMOL/L — SIGNIFICANT CHANGE UP (ref 22–31)
COLOR SPEC: YELLOW — SIGNIFICANT CHANGE UP
CREAT SERPL-MCNC: 0.69 MG/DL — SIGNIFICANT CHANGE UP (ref 0.5–1.3)
DIFF PNL FLD: ABNORMAL
EGFR: 114 ML/MIN/1.73M2 — SIGNIFICANT CHANGE UP
EPI CELLS # UR: SIGNIFICANT CHANGE UP
GLUCOSE SERPL-MCNC: 85 MG/DL — SIGNIFICANT CHANGE UP (ref 70–99)
GLUCOSE UR QL: NEGATIVE MG/DL — SIGNIFICANT CHANGE UP
HCG SERPL-ACNC: <1 MIU/ML — SIGNIFICANT CHANGE UP
KETONES UR-MCNC: NEGATIVE — SIGNIFICANT CHANGE UP
LEUKOCYTE ESTERASE UR-ACNC: NEGATIVE — SIGNIFICANT CHANGE UP
NITRITE UR-MCNC: NEGATIVE — SIGNIFICANT CHANGE UP
PH UR: 6 — SIGNIFICANT CHANGE UP (ref 5–8)
POTASSIUM SERPL-MCNC: 4.1 MMOL/L — SIGNIFICANT CHANGE UP (ref 3.5–5.3)
POTASSIUM SERPL-SCNC: 4.1 MMOL/L — SIGNIFICANT CHANGE UP (ref 3.5–5.3)
PROT SERPL-MCNC: 7.3 GM/DL — SIGNIFICANT CHANGE UP (ref 6–8.3)
PROT UR-MCNC: NEGATIVE MG/DL — SIGNIFICANT CHANGE UP
RBC CASTS # UR COMP ASSIST: ABNORMAL /HPF (ref 0–4)
SODIUM SERPL-SCNC: 141 MMOL/L — SIGNIFICANT CHANGE UP (ref 135–145)
SP GR SPEC: 1.01 — SIGNIFICANT CHANGE UP (ref 1.01–1.02)
UROBILINOGEN FLD QL: NEGATIVE MG/DL — SIGNIFICANT CHANGE UP
WBC UR QL: SIGNIFICANT CHANGE UP

## 2022-08-16 PROCEDURE — 76830 TRANSVAGINAL US NON-OB: CPT | Mod: 26

## 2022-08-16 PROCEDURE — 99285 EMERGENCY DEPT VISIT HI MDM: CPT

## 2022-08-16 RX ORDER — MORPHINE SULFATE 50 MG/1
4 CAPSULE, EXTENDED RELEASE ORAL ONCE
Refills: 0 | Status: DISCONTINUED | OUTPATIENT
Start: 2022-08-16 | End: 2022-08-16

## 2022-08-16 RX ORDER — ACETAMINOPHEN 500 MG
975 TABLET ORAL ONCE
Refills: 0 | Status: COMPLETED | OUTPATIENT
Start: 2022-08-16 | End: 2022-08-16

## 2022-08-16 RX ORDER — KETOROLAC TROMETHAMINE 30 MG/ML
15 SYRINGE (ML) INJECTION ONCE
Refills: 0 | Status: DISCONTINUED | OUTPATIENT
Start: 2022-08-16 | End: 2022-08-16

## 2022-08-16 RX ADMIN — Medication 975 MILLIGRAM(S): at 21:28

## 2022-08-16 RX ADMIN — Medication 15 MILLIGRAM(S): at 21:28

## 2022-08-16 RX ADMIN — MORPHINE SULFATE 4 MILLIGRAM(S): 50 CAPSULE, EXTENDED RELEASE ORAL at 01:25

## 2022-08-16 NOTE — ED PROVIDER NOTE - NS ED ATTENDING STATEMENT MOD
This was a shared visit with the SWETHA. I reviewed and verified the documentation and independently performed the documented:

## 2022-08-16 NOTE — ED PROVIDER NOTE - NSFOLLOWUPINSTRUCTIONS_ED_ALL_ED_FT
Take Tylenol 650mg (Two 325 mg pills) every 4-6 hours as needed for pain or fevers.  Take Motrin 600 mg every 8 hours as needed for moderate pain or fevers -- take with food.  Advance activity as tolerated.  Continue all previously prescribed medications as directed unless otherwise instructed.  Follow up with your primary care physician and gynecologist in 48-72 hours- bring copies of your results.  Return to the ER for worsening or persistent symptoms, and/or ANY NEW OR CONCERNING SYMPTOMS. If you have issues obtaining follow up, please call: [phone] to obtain a doctor or specialist who takes your insurance in your area.  You may call [phone] to make an appointment with the internal medicine clinic. Take Tylenol 650mg (Two 325 mg pills) every 4-6 hours as needed for pain or fevers.  Take Motrin 600 mg every 8 hours as needed for moderate pain or fevers -- take with food.  Advance activity as tolerated.  Continue all previously prescribed medications as directed unless otherwise instructed.  Follow up with your primary care physician and gynecologist in 48-72 hours- bring copies of your results.  Return to the ER for worsening or persistent symptoms, and/or ANY NEW OR CONCERNING SYMPTOMS. If you have issues obtaining follow up, please call: [phone] to obtain a doctor or specialist who takes your insurance in your area.  You may call [phone] to make an appointment with the internal medicine clinic.    Our Discharge Lounge will contact you for appointment with OB/GYN.

## 2022-08-16 NOTE — ED PROVIDER NOTE - ATTENDING APP SHARED VISIT CONTRIBUTION OF CARE
I have personally performed a face to face medical and diagnostic evaluation of the patient. I have discussed with and reviewed the SWETHA's note and agree with the History, ROS, Physical Exam and MDM unless otherwise indicated. A brief summary of my personal evaluation and impression can be found below.    Edmundo CRESPO: 37F hx of uterine fibroids endometriosis, anemia, presents with a cc of suprapubic R pelvic RLQ pain reports was seen at OSH yesterday had labs and was discharged reports this pain feels c/w prior episodes of fibroid pain. Pain persisting despite OTC meds at home. No vaginal discharge bleeding. No urinary complaints, no rash. No diarrhea, constipation. Denies n/v/f/c/cp/sob.    All other ROS negative, except as above and as per HPI and ROS section.    VITALS: Initial triage and subsequent vitals have been reviewed by me.  GEN APPEARANCE: WDWN, alert, non-toxic, NAD  HEAD: Atraumatic.  EYES: PERRLa, EOMI, vision grossly intact.   NECK: Supple  CV: RRR, S1S2, no c/r/m/g. Cap refill <2 seconds. No bruits.   LUNGS: CTAB. No abnormal breath sounds.  ABDOMEN: mild suprapubic rlq ttp  MSK/EXT: No spinal or extremity point tenderness. No CVA ttp. Pelvis stable. No peripheral edema.  NEURO: Alert, follows commands. Weight bearing normal. Speech normal. Sensation and motor normal x4 extremities.   SKIN: Warm, dry and intact. No rash.  PSYCH: Appropriate    Plan/MDM: Edmundo CRESPO: 37F hx of uterine fibroids endometriosis, anemia, presents with a cc of suprapubic R pelvic RLQ pain reports was seen at OSH yesterday had labs and was discharged reports this pain feels c/w prior episodes of fibroid pain. Pain persisting despite OTC meds at home.  exam vss non toxic PE as above ddx c/f likely fibroids, low c/f apppy, will perform pelvic check tvus consider ctap if non dx reassess thereafter.

## 2022-08-16 NOTE — ED PROVIDER NOTE - PROGRESS NOTE DETAILS
labs unremarkable.  signed out pending US report. signed out pending US report. FLOR MILLER: Patient signed out to me to f/u TVUS and CT A/P. TVUS shows fibroid. CT A/P shows fibroid uterus, no other acute finding. Patient reassessed, sleeping comfortably in bed in NAD, denies any complaints. States feeling better, symptoms improved. Discussed with attending, Dr. Chen, patient can be discharged home to f/u with PCP and obgyn. pt requests new gyn referral. The patient was given verbal and written discharge instructions. Specifically, instructions when to return to the ED and when to seek follow-up from their pcp was discussed. Any specialty follow-up was discussed, including how to make an appointment.  Instructions were discussed in simple, plain language and was understood by the patient. The patient understands that should their symptoms worsen or any new symptoms arise, they should return to the ED immediately for further evaluation. All pt's questions were answered. Patient verbalizes understanding.

## 2022-08-16 NOTE — ED PROVIDER NOTE - OBJECTIVE STATEMENT
38 yo F with h/o uterine fibroids, endometriosis, and anemia who presents with right pelvic pain. seen in the ED multiple times for similar complaint.  Pt notes that every few months she gets intense abd pain but workup has been negative. last CT abd/pel was 2/2022 which showed enlarged fibroid.   denies NVD, fever, dysuria, hematuria. denies pregnancy. 36 yo F with h/o uterine fibroids, endometriosis, and anemia who returns for continuing right pelvic pain. seen in the ED yesterday the same complaint. labs and UA unremarkable. last CT abd/pel was 2/2022 which showed enlarged fibroid.   denies NVD, fever, dysuria, hematuria. denies pregnancy.

## 2022-08-16 NOTE — ED PROVIDER NOTE - PHYSICAL EXAMINATION
CONSTITUTIONAL: Well-appearing; well-nourished; in no apparent distress. Non-toxic appearing.   NEURO: Alert & oriented. Gait steady without assistance. Sensory and motor functions are grossly intact.  PSYCH: Mood appropriate. Thought processes intact.   NECK: Supple  CARD: Regular rate and rhythm, no murmurs  RESP: No accessory muscle use; breath sounds clear and equal bilaterally; no wheezes, rhonchi, or rales     ABD: Soft; non-distended; non-tender.   MUSCULOSKELETAL/EXTREMITIES: FROM in all four extremities; no extremity edema.  SKIN: Warm; dry; no apparent lesions or exudate  pelvic exam: no CMT. no abnormal vaginal discharge. tenderness on right pelvis. no vag bleeding.

## 2022-08-16 NOTE — ED ADULT NURSE NOTE - OBJECTIVE STATEMENT
PT Ambulatory from home, complains of constant RLQ pain for about 2 days. was seen at another hospital yesterday and reports no imaging performed. pt denies vaginal discharge, no urinary complaints. finds herself straining to have a BM. no nausea/vomiting. pt denies fevers. skin intact. left ac 20g inserted.

## 2022-08-16 NOTE — ED PROVIDER NOTE - PATIENT PORTAL LINK FT
You can access the FollowMyHealth Patient Portal offered by [facility] by registering at the following website: [email or web address]. By joining [email or web address]’s FollowMyHealth portal, you will also be able to view your health information using other applications (apps) compatible with our system.

## 2022-08-16 NOTE — ED PROVIDER NOTE - CLINICAL SUMMARY MEDICAL DECISION MAKING FREE TEXT BOX
33 yo F here for pelvic pain intermittently for a few months. pain comes every few months then resolves by itself. exam found right pelvic tenderness. previous CT showed enlarging fibroid.   fibroid vs endometriosis vs UTI.  will get labs and US. 31 yo F here for pelvic pain intermittently for a few months. was seen yesterday and return for continuing pain. pain comes every few months then resolves by itself. exam found right pelvic tenderness. previous CT showed enlarging fibroid.   fibroid vs endometriosis vs UTI.  will get US.

## 2022-08-16 NOTE — ED ADULT NURSE NOTE - CHIEF COMPLAINT QUOTE
Pt c/o abd pain radiating to back x 4 days. Denies N/V. Was seen at [facility] yesterday for same and discharged. States pain is worse today.

## 2022-08-16 NOTE — ED ADULT TRIAGE NOTE - RESPIRATORY RATE (BREATHS/MIN)
16 Topical Clindamycin Counseling: Patient counseled that this medication may cause skin irritation or allergic reactions.  In the event of skin irritation, the patient was advised to reduce the amount of the drug applied or use it less frequently.   The patient verbalized understanding of the proper use and possible adverse effects of clindamycin.  All of the patient's questions and concerns were addressed.

## 2022-08-16 NOTE — ED ADULT TRIAGE NOTE - CHIEF COMPLAINT QUOTE
Pt c/o abd pain radiating to back x 4 days. Denies N/V. Was seen at [facility] yesterday for same and discharged. States pain is worse today.

## 2022-08-17 VITALS
OXYGEN SATURATION: 100 % | SYSTOLIC BLOOD PRESSURE: 102 MMHG | DIASTOLIC BLOOD PRESSURE: 64 MMHG | HEART RATE: 56 BPM | TEMPERATURE: 98 F | RESPIRATION RATE: 17 BRPM

## 2022-08-17 LAB
CULTURE RESULTS: SIGNIFICANT CHANGE UP
SPECIMEN SOURCE: SIGNIFICANT CHANGE UP

## 2022-08-17 PROCEDURE — 74177 CT ABD & PELVIS W/CONTRAST: CPT | Mod: 26,MA

## 2022-08-17 RX ORDER — MORPHINE SULFATE 50 MG/1
4 CAPSULE, EXTENDED RELEASE ORAL ONCE
Refills: 0 | Status: DISCONTINUED | OUTPATIENT
Start: 2022-08-17 | End: 2022-08-17

## 2022-08-17 RX ORDER — IBUPROFEN 200 MG
1 TABLET ORAL
Qty: 28 | Refills: 0
Start: 2022-08-17 | End: 2022-08-23

## 2022-08-17 RX ORDER — KETOROLAC TROMETHAMINE 30 MG/ML
15 SYRINGE (ML) INJECTION ONCE
Refills: 0 | Status: DISCONTINUED | OUTPATIENT
Start: 2022-08-17 | End: 2022-08-17

## 2022-08-17 RX ORDER — SODIUM CHLORIDE 9 MG/ML
1000 INJECTION INTRAMUSCULAR; INTRAVENOUS; SUBCUTANEOUS ONCE
Refills: 0 | Status: COMPLETED | OUTPATIENT
Start: 2022-08-17 | End: 2022-08-17

## 2022-08-17 RX ADMIN — Medication 15 MILLIGRAM(S): at 01:59

## 2022-08-17 RX ADMIN — MORPHINE SULFATE 4 MILLIGRAM(S): 50 CAPSULE, EXTENDED RELEASE ORAL at 01:58

## 2022-08-17 RX ADMIN — SODIUM CHLORIDE 1000 MILLILITER(S): 9 INJECTION INTRAMUSCULAR; INTRAVENOUS; SUBCUTANEOUS at 01:59

## 2023-02-13 NOTE — ED ADULT NURSE NOTE - DRUG PRE-SCREENING (DAST -1)
Telephone call:    Patient inquiring about date of last flu shot.  Per chart, it was 09/20/2022.    Ange Watters RN on 2/13/2023 at 12:30 PM    Reason for Disposition    Information only question and nurse able to answer    Protocols used: INFORMATION ONLY CALL - NO TRIAGE-A-OH       Statement Selected

## 2023-12-27 PROBLEM — Z00.00 ENCOUNTER FOR PREVENTIVE HEALTH EXAMINATION: Status: ACTIVE | Noted: 2023-12-27

## 2024-02-12 ENCOUNTER — APPOINTMENT (OUTPATIENT)
Dept: OBGYN | Facility: CLINIC | Age: 39
End: 2024-02-12
Payer: COMMERCIAL

## 2024-02-12 VITALS
HEIGHT: 68 IN | SYSTOLIC BLOOD PRESSURE: 123 MMHG | DIASTOLIC BLOOD PRESSURE: 70 MMHG | HEART RATE: 82 BPM | BODY MASS INDEX: 25.91 KG/M2 | WEIGHT: 171 LBS

## 2024-02-12 DIAGNOSIS — D50.0 IRON DEFICIENCY ANEMIA SECONDARY TO BLOOD LOSS (CHRONIC): ICD-10-CM

## 2024-02-12 DIAGNOSIS — Z82.49 FAMILY HISTORY OF ISCHEMIC HEART DISEASE AND OTHER DISEASES OF THE CIRCULATORY SYSTEM: ICD-10-CM

## 2024-02-12 DIAGNOSIS — Z78.9 OTHER SPECIFIED HEALTH STATUS: ICD-10-CM

## 2024-02-12 PROCEDURE — 99205 OFFICE O/P NEW HI 60 MIN: CPT

## 2024-02-13 RX ORDER — IRON/IRON ASP GLY/FA/MV-MIN 38 125-25-1MG
TABLET ORAL
Refills: 0 | Status: ACTIVE | COMMUNITY

## 2024-02-20 ENCOUNTER — APPOINTMENT (OUTPATIENT)
Dept: OBGYN | Facility: CLINIC | Age: 39
End: 2024-02-20

## 2024-02-27 NOTE — H&P ADULT - NSHPROSALLOTHERNEGRD_GEN_ALL_CORE
E-advice message sent to pt  Initiated PA again, see TE from today   All other review of systems negative, except as noted in HPI Cyclophosphamide Counseling:  I discussed with the patient the risks of cyclophosphamide including but not limited to hair loss, hormonal abnormalities, decreased fertility, abdominal pain, diarrhea, nausea and vomiting, bone marrow suppression and infection. The patient understands that monitoring is required while taking this medication.

## 2024-03-06 ENCOUNTER — EMERGENCY (EMERGENCY)
Facility: HOSPITAL | Age: 39
LOS: 0 days | Discharge: HOME | End: 2024-03-07
Attending: EMERGENCY MEDICINE
Payer: MEDICAID

## 2024-03-06 VITALS
SYSTOLIC BLOOD PRESSURE: 127 MMHG | DIASTOLIC BLOOD PRESSURE: 75 MMHG | RESPIRATION RATE: 19 BRPM | HEART RATE: 86 BPM | OXYGEN SATURATION: 97 % | WEIGHT: 164.91 LBS | TEMPERATURE: 98 F | HEIGHT: 68 IN

## 2024-03-06 DIAGNOSIS — Z20.822 CONTACT WITH AND (SUSPECTED) EXPOSURE TO COVID-19: ICD-10-CM

## 2024-03-06 DIAGNOSIS — R53.81 OTHER MALAISE: ICD-10-CM

## 2024-03-06 DIAGNOSIS — R52 PAIN, UNSPECIFIED: ICD-10-CM

## 2024-03-06 DIAGNOSIS — Z88.1 ALLERGY STATUS TO OTHER ANTIBIOTIC AGENTS STATUS: ICD-10-CM

## 2024-03-06 DIAGNOSIS — R05.8 OTHER SPECIFIED COUGH: ICD-10-CM

## 2024-03-06 DIAGNOSIS — Z87.42 PERSONAL HISTORY OF OTHER DISEASES OF THE FEMALE GENITAL TRACT: ICD-10-CM

## 2024-03-06 DIAGNOSIS — Z96.7 PRESENCE OF OTHER BONE AND TENDON IMPLANTS: Chronic | ICD-10-CM

## 2024-03-06 DIAGNOSIS — D64.9 ANEMIA, UNSPECIFIED: ICD-10-CM

## 2024-03-06 DIAGNOSIS — R53.1 WEAKNESS: ICD-10-CM

## 2024-03-06 DIAGNOSIS — R11.0 NAUSEA: ICD-10-CM

## 2024-03-06 DIAGNOSIS — M25.50 PAIN IN UNSPECIFIED JOINT: ICD-10-CM

## 2024-03-06 PROCEDURE — 99285 EMERGENCY DEPT VISIT HI MDM: CPT

## 2024-03-07 VITALS
HEART RATE: 69 BPM | DIASTOLIC BLOOD PRESSURE: 69 MMHG | SYSTOLIC BLOOD PRESSURE: 104 MMHG | OXYGEN SATURATION: 100 % | RESPIRATION RATE: 19 BRPM | TEMPERATURE: 98 F

## 2024-03-07 LAB
ANION GAP SERPL CALC-SCNC: 4 MMOL/L — LOW (ref 5–17)
ANISOCYTOSIS BLD QL: SLIGHT — SIGNIFICANT CHANGE UP
APTT BLD: 29.4 SEC — SIGNIFICANT CHANGE UP (ref 24.5–35.6)
BASOPHILS # BLD AUTO: 0.04 K/UL — SIGNIFICANT CHANGE UP (ref 0–0.2)
BASOPHILS NFR BLD AUTO: 0.6 % — SIGNIFICANT CHANGE UP (ref 0–2)
BLD GP AB SCN SERPL QL: SIGNIFICANT CHANGE UP
BUN SERPL-MCNC: 4 MG/DL — LOW (ref 7–23)
CALCIUM SERPL-MCNC: 8.5 MG/DL — SIGNIFICANT CHANGE UP (ref 8.5–10.1)
CHLORIDE SERPL-SCNC: 112 MMOL/L — HIGH (ref 96–108)
CO2 SERPL-SCNC: 26 MMOL/L — SIGNIFICANT CHANGE UP (ref 22–31)
CREAT SERPL-MCNC: 0.58 MG/DL — SIGNIFICANT CHANGE UP (ref 0.5–1.3)
EGFR: 118 ML/MIN/1.73M2 — SIGNIFICANT CHANGE UP
EOSINOPHIL # BLD AUTO: 0.09 K/UL — SIGNIFICANT CHANGE UP (ref 0–0.5)
EOSINOPHIL NFR BLD AUTO: 1.3 % — SIGNIFICANT CHANGE UP (ref 0–6)
FLUAV AG NPH QL: SIGNIFICANT CHANGE UP
FLUBV AG NPH QL: SIGNIFICANT CHANGE UP
GLUCOSE SERPL-MCNC: 86 MG/DL — SIGNIFICANT CHANGE UP (ref 70–99)
HCG UR QL: NEGATIVE — SIGNIFICANT CHANGE UP
HCT VFR BLD CALC: 19.5 % — CRITICAL LOW (ref 34.5–45)
HGB BLD-MCNC: 5.6 G/DL — CRITICAL LOW (ref 11.5–15.5)
HYPOCHROMIA BLD QL: SIGNIFICANT CHANGE UP
IMM GRANULOCYTES NFR BLD AUTO: 1 % — HIGH (ref 0–0.9)
INR BLD: 0.91 RATIO — SIGNIFICANT CHANGE UP (ref 0.85–1.18)
LYMPHOCYTES # BLD AUTO: 2.22 K/UL — SIGNIFICANT CHANGE UP (ref 1–3.3)
LYMPHOCYTES # BLD AUTO: 31.2 % — SIGNIFICANT CHANGE UP (ref 13–44)
MACROCYTES BLD QL: SLIGHT — SIGNIFICANT CHANGE UP
MANUAL SMEAR VERIFICATION: SIGNIFICANT CHANGE UP
MCHC RBC-ENTMCNC: 20.7 PG — LOW (ref 27–34)
MCHC RBC-ENTMCNC: 28.7 G/DL — LOW (ref 32–36)
MCV RBC AUTO: 72.2 FL — LOW (ref 80–100)
MICROCYTES BLD QL: SLIGHT — SIGNIFICANT CHANGE UP
MONOCYTES # BLD AUTO: 0.57 K/UL — SIGNIFICANT CHANGE UP (ref 0–0.9)
MONOCYTES NFR BLD AUTO: 8 % — SIGNIFICANT CHANGE UP (ref 2–14)
NEUTROPHILS # BLD AUTO: 4.12 K/UL — SIGNIFICANT CHANGE UP (ref 1.8–7.4)
NEUTROPHILS NFR BLD AUTO: 57.9 % — SIGNIFICANT CHANGE UP (ref 43–77)
NRBC # BLD: 1 /100 WBCS — HIGH (ref 0–0)
OVALOCYTES BLD QL SMEAR: SLIGHT — SIGNIFICANT CHANGE UP
PLAT MORPH BLD: NORMAL — SIGNIFICANT CHANGE UP
PLATELET # BLD AUTO: 171 K/UL — SIGNIFICANT CHANGE UP (ref 150–400)
POLYCHROMASIA BLD QL SMEAR: SLIGHT — SIGNIFICANT CHANGE UP
POTASSIUM SERPL-MCNC: 3.5 MMOL/L — SIGNIFICANT CHANGE UP (ref 3.5–5.3)
POTASSIUM SERPL-SCNC: 3.5 MMOL/L — SIGNIFICANT CHANGE UP (ref 3.5–5.3)
PROTHROM AB SERPL-ACNC: 10.9 SEC — SIGNIFICANT CHANGE UP (ref 9.5–13)
RBC # BLD: 2.7 M/UL — LOW (ref 3.8–5.2)
RBC # FLD: 26 % — HIGH (ref 10.3–14.5)
RBC BLD AUTO: ABNORMAL
SARS-COV-2 RNA SPEC QL NAA+PROBE: SIGNIFICANT CHANGE UP
SCHISTOCYTES BLD QL AUTO: SLIGHT — SIGNIFICANT CHANGE UP
SODIUM SERPL-SCNC: 142 MMOL/L — SIGNIFICANT CHANGE UP (ref 135–145)
WBC # BLD: 7.11 K/UL — SIGNIFICANT CHANGE UP (ref 3.8–10.5)
WBC # FLD AUTO: 7.11 K/UL — SIGNIFICANT CHANGE UP (ref 3.8–10.5)

## 2024-03-07 RX ORDER — ONDANSETRON 8 MG/1
4 TABLET, FILM COATED ORAL ONCE
Refills: 0 | Status: COMPLETED | OUTPATIENT
Start: 2024-03-07 | End: 2024-03-07

## 2024-03-07 RX ORDER — SODIUM CHLORIDE 9 MG/ML
1000 INJECTION INTRAMUSCULAR; INTRAVENOUS; SUBCUTANEOUS ONCE
Refills: 0 | Status: COMPLETED | OUTPATIENT
Start: 2024-03-07 | End: 2024-03-07

## 2024-03-07 RX ADMIN — SODIUM CHLORIDE 1000 MILLILITER(S): 9 INJECTION INTRAMUSCULAR; INTRAVENOUS; SUBCUTANEOUS at 02:15

## 2024-03-07 RX ADMIN — ONDANSETRON 4 MILLIGRAM(S): 8 TABLET, FILM COATED ORAL at 01:11

## 2024-03-07 RX ADMIN — SODIUM CHLORIDE 1000 MILLILITER(S): 9 INJECTION INTRAMUSCULAR; INTRAVENOUS; SUBCUTANEOUS at 01:12

## 2024-03-07 NOTE — ED PROVIDER NOTE - CARE PROVIDER_API CALL
Bibiana Guzman  Obstetrics and Gynecology  [address] [place] [address]  Phone: [phone]  Fax: [phone]  Follow Up Time:

## 2024-03-07 NOTE — ED PROVIDER NOTE - PATIENT PORTAL LINK FT
You can access the FollowMyHealth Patient Portal offered by [facility] by registering at the following website: [email or web address]. By joining [employer]’s FollowMyHealth portal, you will also be able to view your health information using other applications (apps) compatible with our system.

## 2024-03-07 NOTE — ED PROVIDER NOTE - CARE PROVIDERS DIRECT ADDRESSES
no rashes , no suspicious lesions , no areas of discoloration , no jaundice present , good turgor , no masses , no tenderness on palpation , no rashes , no suspicious lesions , no areas of discoloration , no jaundice present , good turgor , no masses , no tenderness on palpation ,DirectAddress_Unknown

## 2024-03-07 NOTE — ED PROVIDER NOTE - NSFOLLOWUPINSTRUCTIONS_ED_ALL_ED_FT
Follow up with your gynecologist  Rest, drink plenty of fluids  Advance activity as tolerated  Continue all previously prescribed medications as directed  Follow up with your PMD - bring copies of your results  Return to the ER for chest pain, difficulty breathing, worsening symptoms, or other new or concerning symptoms

## 2024-03-07 NOTE — ED ADULT NURSE NOTE - CADM POA CENTRAL LINE
Physical Therapy Discharge (provisional)    Visit Type: Discharge Summary  Visit: 6  Referring Provider: Jus Mcneil DPM  Medical Diagnosis (from order): Diagnosis Information    Diagnosis  726.73 (ICD-9-CM) - M77.31 (ICD-10-CM) - Calcaneal spur of right foot  729.5 (ICD-9-CM) - M79.671 (ICD-10-CM) - Right foot pain  719.50 (ICD-9-CM) - M25.60 (ICD-10-CM) - Restriction of joint motion         SUBJECTIVE                                                                                                               Reports continues to improve vs start of care.  Able to do stairs normal.  Walking with less compensation.  Feels she could continue on her own after today's visit.    Functional Change: Walking better  Stairs easier  Activity tolerance increased   Current Functional Limitations: Prolonged walking    Pain / Symptoms  - Pain rating (out of 10): Current: 2 ; Best: 1; Worst: 4      OBJECTIVE                                                                                                                                       Treatment    Ultrasound (65676)  - Location: right distal achilles  - Position: prone  - Duty Cycle: 50%  - Frequency: 2 Mhz  - Intensity (w/cm2): 1.5  - Duration: 8 minutes    Results: no change in symptoms immediately following  Reaction: no adverse reaction to treatment      Therapeutic Exercise  Reviewed:  Seated towel stretch for calf and achilles  Standing calf stretch   Heel and toe raises      Manual Therapy   Soft tissue mobilization to right gastrocs, achilles   Forefoot and rearfoot mobilization to increase foot accommodation     Right ankle ROM is WNL all planes     Skilled input: verbal instruction/cues, tactile instruction/cues, posture correction, demonstration and as detailed above    Writer verbally educated and received verbal consent for hand placement, positioning of patient, and techniques to be performed today from patient for therapist position for techniques and  hand placement and palpation for techniques as described above and how they are pertinent to the patient's plan of care.  Home Exercise Program  Seated towel stretch for calf and achilles  Standing calf stretch   Heel and toe raises       ASSESSMENT                                                                                                          To date the patient has made gains as expected.    Patient is progressing slow but steady.  Sx are better controlled vs start of care.  Swelling at distal achilles is still present but to lesser degree than start of care.  Feel patient could with HEP at this point and Sx should fully resolve within two months.      PLAN                                                                                                                         Patient to continue with HEP.  Patient to follow up with PT within next 4 weeks if having further issues, otherwise will discharge PT.       Goals  Long Term Goals: to be met by end of plan of care  1. Patient will stand >30 minutes for household tasks and ADL without compensation or right foot pain >3/10.  Status: progressing/ongoing  2. Patient will ambulate >20 minutes for grocery shopping with minimal right lower extremity compensation and without pain >3/10. Status: progressing/ongoing  3. Patient will ascend and descend stairs using step through pattern with minimal compensation or pain for safe access throughout home and community.  Status: met   4. Patient will be independent with progressed and modified HEP.   Status: met       Therapy procedure time and total treatment time can be found documented on the Time Entry flowsheet     No

## 2024-03-07 NOTE — ED PROVIDER NOTE - PROGRESS NOTE DETAILS
Pt found to have severe anemia and will require transfusion.  Discussed with patient at length the findings of her lab results and the need for transfusion of 2 units.  The patient confirms that she has no vaginal bleeding.  She additionally reveals that is schedule to have fibroid intervention within the next 2 months with her GYN.  I discussed the risks and benefits of RBC transfusion.  The patient has signed consent.  Will sign the patient out to the overnight MD to follow the patient during her transfusion and reassess for dispo no events during shift, pt. receiving transfusion, plan to D/C when transfusion is complete  vitally stable, no active pelvic bleeding at this time  pt. has known history of menorrhagia and fibroids Condition unchanged but notes symptomatic improvement after completing transfusion.  Currently taking iron, has gyn to follow up with, and menstrual cycle completed 2 days ago with no other bleeding.  Feels comfortable with dc.

## 2024-03-07 NOTE — ED PROVIDER NOTE - OBJECTIVE STATEMENT
38F with a hx of fibroids presenting to the ED for a 2 day of generalized weakness and malaise which began in association with passing large clots during her menstrual cycle.  The patient states that she does have a hx of anemia which has required transfusion and iron in the past.  The patient also has bodyaches and arthralgia x 2 days with dry cough and pleuritic CP; no sick contacts; no recent travel 38F with a hx of fibroids presenting to the ED for a 2 day of generalized weakness and malaise which began in association with passing large clots during her menstrual cycle. The patient states that her menstrual cycle ended 2 days ago and currently has no bleeding.  The patient states that she does have a hx of anemia which has required transfusion (2019) and iron in the past.  The patient also has body aches and arthralgia x 2 days with dry cough and pleuritic CP; no sick contacts; no recent travel

## 2024-03-07 NOTE — ED PROVIDER NOTE - CLINICAL SUMMARY MEDICAL DECISION MAKING FREE TEXT BOX
38F with hx of fibroids (no active bleeding) presenting to the ED with generalized malaise and weakness which began during her heavy cycle.  last day was 2 days ago, however the symptoms persisted.  The patient also noted URI symptom today as well.  Patient VS wnl; found to have severe anemia requiring transfusion

## 2024-03-07 NOTE — ED ADULT NURSE NOTE - OBJECTIVE STATEMENT
pt c/o not "feeling well" pt states she has hx of anemia due to heavy periods and fibroids and feels like her hemoglobin is low

## 2024-03-16 NOTE — HISTORY OF PRESENT ILLNESS
[FreeTextEntry1] : 37 yo P0 her for eval of ut myomas. Pt w/HMB, bleeding during intercourse. Interested in TTC now via IVF.   Referred by Dr. Luisa Handley

## 2024-03-16 NOTE — DISCUSSION/SUMMARY
[FreeTextEntry1] : 39 yo P0 w/fibroids, adenomyosis, and endocervical polyp. MRI images reviewed on [facility]. Pt interested in TTC via IVF. Now interested in myomectomy and polypectomy.  Plan Talk to Dr. Handley about expectations and plan.  -can address fibroids and polyp, but not diffuse adeno Consider scheduling surgery

## 2024-03-19 ENCOUNTER — NON-APPOINTMENT (OUTPATIENT)
Age: 39
End: 2024-03-19

## 2024-03-20 ENCOUNTER — NON-APPOINTMENT (OUTPATIENT)
Age: 39
End: 2024-03-20

## 2024-03-25 NOTE — ED ADULT TRIAGE NOTE - NSTRIAGECARE_GEN_A_ER
Received request for prescription refills for patient.   Patient follows with Dr. Naveed Lanier      Last OV 11/29/23  Next OV 11/21/24    Pended for signing and sent to provider     Face Mask

## 2024-03-27 NOTE — ED PROVIDER NOTE - PROGRESS NOTE
Case Management Assessment & Discharge Planning Note    Patient name Tommie Ramirez  Location /-01 MRN [id number]  : 1955 Date 3/27/2024       Current Admission Date: 3/26/2024  Current Admission Diagnosis:Acute on chronic diastolic (congestive) heart failure (HCC)   Patient Active Problem List    Diagnosis Date Noted    Cellulitis of left lower extremity 2024    Acute on chronic diastolic (congestive) heart failure (HCC) 2023    Elevated lipase 2023    Chronic respiratory failure with hypoxia (HCC) 2023    Acute on chronic respiratory failure (HCC) 2022    Acute diastolic (congestive) heart failure (HCC) 2022    Morbid obesity with BMI of 40.0-44.9, adult (HCC) 2022    COPD, severe ([facility])     Essential hypertension     Tobacco abuse       LOS (days): 1  Geometric Mean LOS (GMLOS) (days): 3.9  Days to GMLOS:3.1     OBJECTIVE:    Risk of Unplanned Readmission Score: 11.18         Current admission status: Inpatient  Referral Reason: Other (Discharge planning)    Preferred Pharmacy:   [facility] [address]  [address] [facility] [address]  Phone: [phone] Fax: [phone]    [id number] - [place], [place] - [address]  [address] [facility] [address]  Phone: [phone] Fax: [phone]    Primary Care Provider: Herb Campbell DO    Primary Insurance: MEDICARE  Secondary Insurance: AARP    ASSESSMENT:  Active Health Care Proxies    There are no active Health Care Proxies on file.       Advance Directives  Does patient have a Health Care POA?: No  Was patient offered paperwork?: Yes (Given at bedside)  Does patient currently have a Health Care decision maker?: Yes, please see Health Care Proxy section  Does patient have Advance Directives?: No  Was patient offered paperwork?: Yes (Given at bedside)  Primary Contact: Daughter - Lenora Marie  [employer]  30 Day Readmission: No    Patient Information  Admitted from:: Home  Mental Status: Alert  During Assessment patient was accompanied by: Spouse  Assessment information provided by:: Patient  Primary Caregiver: Spouse  Caregiver's Name:: Dyan (Spouse)  Caregiver's Relationship to Patient:: Significant Other  Caregiver's Telephone Number:: [phone]  Support Systems: Spouse/significant other, Daughter  County of Residence: [facility]  What city do you live in?: [place]  Home entry access options. Select all that apply.: Stairs  Number of steps to enter home.: 4  Do the steps have railings?: Yes  Type of Current Residence: [facility]  Living Arrangements: Lives w/ Spouse/significant other  Is patient a ?: No    Activities of Daily Living Prior to Admission  Functional Status: Total dependent  Completes ADLs independently?: No  Level of ADL dependence: Assistance  Ambulates independently?: Yes (with supervision)  Does patient use assisted devices?: Yes  Assisted Devices (DME) used: Walker, Straight Cane, Home Oxygen concentrator, Portable Oxygen tanks  DME Company Name (respiratory supplies): [employer]  O2 Rate(s): 4L  Does patient currently own DME?: Yes  What DME does the patient currently own?: Straight Cane, Walker, Portable Oxygen tanks, Home Oxygen concentrator  Does patient have a history of Outpatient Therapy (PT/OT)?: Yes ([facility])  Does the patient have a history of Short-Term Rehab?: No  Does patient have a history of HHC?: No  Does patient currently have HHC?: No         Patient Information Continued  Income Source: SSI/SSD  Does patient have prescription coverage?: Yes  Does patient receive dialysis treatments?: No  Does patient have a history of substance abuse?: Yes  Historical substance use preference: Alcohol/ETOH  History of Withdrawal Symptoms: Other withdrawal symptoms (specify in comment) (mind racing, anxiety, worrying)  Is patient currently in treatment for substance  abuse?: N/A - sober (3 years)  Does patient have a history of Mental Health Diagnosis?: No         Means of Transportation  Means of Transport to Appts:: Family transport (Patient has valid DL but spouse Zully drives - occasionally patient will take a ride on their motercycle)      Social Determinants of Health (SDOH)      Flowsheet Row Most Recent Value   Housing Stability    In the last 12 months, was there a time when you were not able to pay the mortgage or rent on time? N   In the last 12 months, how many places have you lived? 1   In the last 12 months, was there a time when you did not have a steady place to sleep or slept in a shelter (including now)? N   Transportation Needs    In the past 12 months, has lack of transportation kept you from medical appointments or from getting medications? no   In the past 12 months, has lack of transportation kept you from meetings, work, or from getting things needed for daily living? No   Food Insecurity    Within the past 12 months, you worried that your food would run out before you got the money to buy more. Never true   Within the past 12 months, the food you bought just didn't last and you didn't have money to get more. Never true   Utilities    In the past 12 months has the electric, gas, oil, or water company threatened to shut off services in your home? No            DISCHARGE DETAILS:    Discharge planning discussed with:: Patient, Spouse  Freedom of Choice: Yes  Comments - Freedom of Choice: Patient does not wish to have any services at discharge including OP PT/PT, HHC or STR  CM contacted family/caregiver?: Yes  Were Treatment Team discharge recommendations reviewed with patient/caregiver?: Yes  Did patient/caregiver verbalize understanding of patient care needs?: Yes  Were patient/caregiver advised of the risks associated with not following Treatment Team discharge recommendations?: Yes    Contacts  Patient Contacts: Dyan spouse  Relationship to  Patient:: Family  Contact Method: In Person  Reason/Outcome: Continuity of Care, Discharge Planning    Requested Home Health Care         Is the patient interested in HHC at discharge?: No    DME Referral Provided  Referral made for DME?: No    Other Referral/Resources/Interventions Provided:  Interventions: None Indicated  Referral Comments: Patient refused    Would you like to participate in our [facility] service program?  : No - Declined        CM met with patient and spouse at the bedside,baseline information  was obtained. CM discussed the role of CM in helping the patient develop a discharge plan and assist the patient in carry out their plan.   Patient resides with their wife in a one story home with 4 steps to get into the home. Patient reports to be totally dependant on patient's spouse but is able to do various activities like gardening. Patient admits to being able to ambulate on their own with a walker/cane but rarely does and waits for supervision. Patient is on 4L of O2 at home with [employer] being his supplier. Patient admitted to a history of alcoholism and has been sober completely for 3 years.   Patient expressed issues paying for their inhaler each month as it is $500 and sometimes goes without     CM discussed d/c planning. Patient feels HHC would be beneficial however they are going to refuse all LOC as they want as minimal contact with people due to their illnesses as they are immunocompromised.      CM to follow patient's care and discharge needs.                                                             Stable.

## 2024-03-29 NOTE — ED ADULT NURSE NOTE - NS ED NURSE IV DC DT
Goal Outcome Evaluation:  Plan of Care Reviewed With: patient        Progress: no change  Outcome Evaluation: PT eval completed.  Pt oriented X 4 except did not know the current month.  Pt very sleepy during visit and keeps his eyes closed most of the time, however rouses to answer questions and perform tasks.  Pt w/ dressing in place L foot.  Pt demonstrates generalized weakness.  Performs rolling w/ min assist, scooting up in bed w/ max assist and modifications, supine to sit w/ mod assist and sit to supine w/ min to CGA.  Pt w/ increase c/os feeling dizzy upon sitting up w/out improvements reported.  Pt also reported back pain at rest, which changed to abdominal pain w/ activity and remained at completion of visit.  Out of bed activity held this date.  Pt w/ low tolerance to upright w/ increase c/os, but also awaiting MRI and clarication re: plans for L foot.  PT to follow for continued strengthening, bed mobility training, to improve activity tolerance, to assist w/ skin protection/pressure relief and to progress out of bed activity as safely able and when activity advised ok.  Pt remain NWB L LE until otherwise ordered.  Will follow for progress and needs.  Anticipate pt may need stay in subacute rehab or SNF prior to returnng home pending progress.      Anticipated Discharge Disposition (PT): sub acute care setting, skilled nursing facility (pending progress)                         23-Apr-2022 14:39

## 2024-04-15 ENCOUNTER — APPOINTMENT (OUTPATIENT)
Dept: OBGYN | Facility: CLINIC | Age: 39
End: 2024-04-15
Payer: MEDICAID

## 2024-04-15 VITALS
BODY MASS INDEX: 25.16 KG/M2 | WEIGHT: 166 LBS | SYSTOLIC BLOOD PRESSURE: 112 MMHG | HEART RATE: 77 BPM | HEIGHT: 68 IN | DIASTOLIC BLOOD PRESSURE: 77 MMHG

## 2024-04-15 DIAGNOSIS — N80.03 ADENOMYOSIS OF THE UTERUS: ICD-10-CM

## 2024-04-15 PROCEDURE — 99213 OFFICE O/P EST LOW 20 MIN: CPT

## 2024-04-15 NOTE — DISCUSSION/SUMMARY
[FreeTextEntry1] : 39 yo P0 here for f/u after MRI. Now s/p transfusion pRBC 3/2024. MRI w/diffuse adeno, small IM myomas, and questionable EM polyp. Multiple questions from pt and her sister answered. Will consider intervention for adeno in the future. We spoke about plan to remove intrauterine lesion to improve her chances for fertility.  Plan Schedule [facility] polyp  Letter to Dr. Luisa Handley

## 2024-04-15 NOTE — RESULTS/DATA
[TextEntry] : MRI from [facility] reviewed w/pt and sister (on phone). Diffuse adeno, small fibroids and likely EM polyp

## 2024-04-26 ENCOUNTER — NON-APPOINTMENT (OUTPATIENT)
Age: 39
End: 2024-04-26

## 2024-05-01 ENCOUNTER — OUTPATIENT (OUTPATIENT)
Dept: OUTPATIENT SERVICES | Facility: HOSPITAL | Age: 39
LOS: 1 days | End: 2024-05-01

## 2024-05-01 VITALS
RESPIRATION RATE: 16 BRPM | SYSTOLIC BLOOD PRESSURE: 100 MMHG | HEART RATE: 63 BPM | HEIGHT: 68.5 IN | DIASTOLIC BLOOD PRESSURE: 65 MMHG | OXYGEN SATURATION: 98 % | WEIGHT: 164.02 LBS | TEMPERATURE: 98 F

## 2024-05-01 DIAGNOSIS — N84.0 POLYP OF CORPUS UTERI: ICD-10-CM

## 2024-05-01 DIAGNOSIS — Z96.7 PRESENCE OF OTHER BONE AND TENDON IMPLANTS: Chronic | ICD-10-CM

## 2024-05-01 LAB
HCG SERPL-ACNC: <1 MIU/ML — SIGNIFICANT CHANGE UP
HCT VFR BLD CALC: 28 % — LOW (ref 34.5–45)
HGB BLD-MCNC: 8.6 G/DL — LOW (ref 11.5–15.5)
MCHC RBC-ENTMCNC: 25 PG — LOW (ref 27–34)
MCHC RBC-ENTMCNC: 30.7 GM/DL — LOW (ref 32–36)
MCV RBC AUTO: 81.4 FL — SIGNIFICANT CHANGE UP (ref 80–100)
NRBC # BLD: 0 /100 WBCS — SIGNIFICANT CHANGE UP (ref 0–0)
NRBC # FLD: 0 K/UL — SIGNIFICANT CHANGE UP (ref 0–0)
PLATELET # BLD AUTO: 365 K/UL — SIGNIFICANT CHANGE UP (ref 150–400)
RBC # BLD: 3.44 M/UL — LOW (ref 3.8–5.2)
RBC # FLD: 19.7 % — HIGH (ref 10.3–14.5)
WBC # BLD: 3.51 K/UL — LOW (ref 3.8–10.5)
WBC # FLD AUTO: 3.51 K/UL — LOW (ref 3.8–10.5)

## 2024-05-01 RX ORDER — SODIUM CHLORIDE 9 MG/ML
1000 INJECTION, SOLUTION INTRAVENOUS
Refills: 0 | Status: DISCONTINUED | OUTPATIENT
Start: 2024-05-03 | End: 2024-05-17

## 2024-05-01 NOTE — H&P PST ADULT - GENITOURINARY COMMENTS
Heavy bleeding with menses for past  6 yrs and uterine polyp known 6 months ago - also hx fibroids not examined

## 2024-05-01 NOTE — H&P PST ADULT - HISTORY OF PRESENT ILLNESS
Pt is a 38 yr old female scheduled for Hysteroscopic Polypectomy with Dr Anthony tentatively 5/3/24 - pt c/o of heavy vaginal bleeding for past several years with noted uterine polyps and fibroids - pt received transfusion 4/24 2u PC - pt now for procedure

## 2024-05-01 NOTE — H&P PST ADULT - NSICDXPASTMEDICALHX_GEN_ALL_CORE_FT
PAST MEDICAL HISTORY:  Anemia     Clavicle fracture ' 2009  Left. surgically treated    Endometriosis     Menorrhagia with regular cycle     Tenosynovitis of hand or wrist Right    Uterine fibroid      PAST MEDICAL HISTORY:  Anemia     Clavicle fracture ' 2009  Left. surgically treated    Endometriosis     Menorrhagia with regular cycle     Tenosynovitis of hand or wrist Right    Uterine fibroid     Uterine polyp

## 2024-05-01 NOTE — H&P PST ADULT - GENERAL
Pharmacy Name: [id number] - [place], [place] - [address] [facility] [address] [facility] [address] [facility] [phone] [facility] [phone] FX     What medication are you calling in regards to: hydrALAZINE (APRESOLINE) 25 MG tablet     What question does the pharmacy have: PLEASE TRANSFER TO [facility] PATIENT IS OUT OF MEDICATION     
negative

## 2024-05-02 ENCOUNTER — TRANSCRIPTION ENCOUNTER (OUTPATIENT)
Age: 39
End: 2024-05-02

## 2024-05-02 NOTE — ASU PATIENT PROFILE, ADULT - FALL HARM RISK - UNIVERSAL INTERVENTIONS
Bed in lowest position, wheels locked, appropriate side rails in place/Call bell, personal items and telephone in reach/Instruct patient to call for assistance before getting out of bed or chair/Non-slip footwear when patient is out of bed/[place] to call system/Physically safe environment - no spills, clutter or unnecessary equipment/Purposeful Proactive Rounding/Room/bathroom lighting operational, light cord in reach

## 2024-05-02 NOTE — ASU PATIENT PROFILE, ADULT - NSICDXPASTMEDICALHX_GEN_ALL_CORE_FT
PAST MEDICAL HISTORY:  Anemia     Clavicle fracture ' 2009  Left. surgically treated    Endometriosis     Menorrhagia with regular cycle     Tenosynovitis of hand or wrist Right    Uterine fibroid     Uterine polyp

## 2024-05-03 ENCOUNTER — TRANSCRIPTION ENCOUNTER (OUTPATIENT)
Age: 39
End: 2024-05-03

## 2024-05-03 ENCOUNTER — APPOINTMENT (OUTPATIENT)
Dept: OBGYN | Facility: HOSPITAL | Age: 39
End: 2024-05-03

## 2024-05-03 ENCOUNTER — RESULT REVIEW (OUTPATIENT)
Age: 39
End: 2024-05-03

## 2024-05-03 ENCOUNTER — OUTPATIENT (OUTPATIENT)
Dept: OUTPATIENT SERVICES | Facility: HOSPITAL | Age: 39
LOS: 1 days | Discharge: HOME | End: 2024-05-03
Payer: MEDICAID

## 2024-05-03 VITALS
DIASTOLIC BLOOD PRESSURE: 57 MMHG | WEIGHT: 164.02 LBS | HEART RATE: 70 BPM | HEIGHT: 68.5 IN | TEMPERATURE: 98 F | RESPIRATION RATE: 18 BRPM | SYSTOLIC BLOOD PRESSURE: 110 MMHG | OXYGEN SATURATION: 100 %

## 2024-05-03 VITALS
OXYGEN SATURATION: 100 % | DIASTOLIC BLOOD PRESSURE: 68 MMHG | HEART RATE: 78 BPM | RESPIRATION RATE: 19 BRPM | SYSTOLIC BLOOD PRESSURE: 118 MMHG

## 2024-05-03 DIAGNOSIS — N84.0 POLYP OF CORPUS UTERI: ICD-10-CM

## 2024-05-03 DIAGNOSIS — Z96.7 PRESENCE OF OTHER BONE AND TENDON IMPLANTS: Chronic | ICD-10-CM

## 2024-05-03 PROCEDURE — 88305 TISSUE EXAM BY PATHOLOGIST: CPT | Mod: 26

## 2024-05-03 PROCEDURE — 58558 HYSTEROSCOPY BIOPSY: CPT

## 2024-05-03 DEVICE — MYOSURE TISSUE REMOVAL DEVICE XL: Type: IMPLANTABLE DEVICE | Status: FUNCTIONAL

## 2024-05-03 RX ORDER — ONDANSETRON 8 MG/1
4 TABLET, FILM COATED ORAL ONCE
Refills: 0 | Status: DISCONTINUED | OUTPATIENT
Start: 2024-05-03 | End: 2024-05-17

## 2024-05-03 RX ORDER — HYDROMORPHONE HYDROCHLORIDE 2 MG/ML
0.25 INJECTION INTRAMUSCULAR; INTRAVENOUS; SUBCUTANEOUS
Refills: 0 | Status: DISCONTINUED | OUTPATIENT
Start: 2024-05-03 | End: 2024-05-03

## 2024-05-03 RX ORDER — HYDROMORPHONE HYDROCHLORIDE 2 MG/ML
0.5 INJECTION INTRAMUSCULAR; INTRAVENOUS; SUBCUTANEOUS
Refills: 0 | Status: DISCONTINUED | OUTPATIENT
Start: 2024-05-03 | End: 2024-05-03

## 2024-05-03 NOTE — ASU PREOP CHECKLIST - IDENTIFICATION BAND VERIFIED
[facility]   Physical Therapy Treatment Note, Re-assessment, and Discharge Summary    Name: Wendi He  Clinic Number: [id number]    Therapy Diagnosis:   Encounter Diagnoses   Name Primary?    Decreased range of motion (ROM) of left knee Yes    Weakness of left lower extremity     Decreased functional activity tolerance      Physician: Deborah Jennings MD    Visit Date: 6/26/2023    Physician Orders: PT Eval and Treat  Medical Diagnosis from Referral: Acute pain of both knees [M25.561, M25.562]  Evaluation Date: 3/23/2023  Authorization Period Expiration: 12/31/23  Plan of Care Expiration: 7/17/2023 (Updated)  Progress Note Due: 6/22/2023  Visit # / Visits authorized: 8 / 40 (1 / 1 Eval) (re-assessed on 6/26/23)  FOTO: 4     Precautions: Standard, peripheral neuropathy, HTN, anemia, DMII, anxiety, depression    PTA Visit #: 0/5     Time In: 8:30 AM  Time Out: 9:15 AM  Total Billable Time: 45 minutes    Date of Last visit: 6/26/23  Total Visits Received: 9    SUBJECTIVE     Pt reports: that she is ready for today to be her last day. Feels like things are feeling better overall since starting therapy and can use the home exercises to help maintain her improvements.    She was compliant with home exercise program.  Response to previous treatment: no adverse reactions   Functional change: in progress    Pain: 2/10  Location: left knee      OBJECTIVE     Objective Measures updated at progress report unless specified.     CMS Impairment/Limitation/Restriction for FOTO Knee Survey     Therapist reviewed FOTO scores for Wendi He on 6/26/2023.   FOTO documents entered into EPIC - see Media section.     Limitation Score: 23%      Gait: slightly antalgic, WFL     Balance: WFL (~10s B)     Reflex/Sensation: Sensation WNL to light touch B LE's . Reflexes at Achilles and patella tendon 1+.     Edema: WNL     Knee AROM:  RLE 0- 120 active, 0-125 passive. Discomfort at end range flexion and  overpressure into extension  LLE 0 - 125 WNL                Lower Extremity Strength  Right LE   Left LE     Hip flexion: 5/5 Hip flexion: 4+/5   Hip extension:  5/5 Hip extension: 4+/5   Hip abduction: 5/5 Hip abduction: 4+/5   Hip adduction:  5/5 Hip adduction:  5/5   Knee Flexion 5/5 Knee Flexion 4+/5   Knee Extension 5/5 Knee Extension 5/5   Ankle dorsiflexion: 5/5 Ankle dorsiflexion: 5/5   Ankle plantarflexion: 5/5 Ankle plantarflexion: 5/5   *denotes pain      Joint Mobility: limited ankle mobility bilaterally     Muscle Length:  Hamstring 90/90 Test WNL  Slight limitations in left quad length     Special Test:                Anterior Drawer           -           Posterior Drawer         -  Lachman                     -           Post Lachman             -  Valgus stress              -           Varus stress                -  Elvira                    +          Thessaly                      +  Apley Compress         +          Apley Distract              +  Kiley                         -           Clarkes                        -  Brush                           -           Wilsons                       -     Palpation: mild to moderate TTP lateral joint line left knee    Treatment     Wendi received the treatments listed below:      Therapeutic Exercises to develop strength, endurance, ROM, flexibility, posture, and core stabilization for (45) minutes including:    - Re-assessment  Recumbent Bike x8 min (Seat: 5; Level 5)  Shuttle Press x30 reps (3.5 black cords) B - pillow for back and hips  Shuttle Press x20 reps (2 black cord, ) U  TRX Sit to Stands 3x10 reps + 20in box  Standing Hip Abduction 3x10 reps B + RTB around shins  Standing Calf stretch 2 min, slant board level 3  Standing Soleus stretch 2 min  Seated Hamstring Stretch 5x5s B + stool  Standing calf raises x30 reps    Patient Education and Home Exercises     Home Exercises Provided and Patient Education Provided     Education provided:   -  Home Exercise Program Review  - Post Exercise Soreness  - Maintaining a pain free range of motion with all activities  - Anatomy/Physiology of the Knees and the surrounding musculature.    Written Home Exercises Provided: Patient instructed to cont prior HEP. Exercises were reviewed and Wendi was able to demonstrate them prior to the end of the session.  Wendi demonstrated good  understanding of the education provided. See EMR under Patient Instructions for exercises provided during therapy sessions.    ASSESSMENT     Upon re-assessment, noted patient to demonstrate improved LE strength since the initial evaluation and improved functional mobility overall. Patient is comfortable with discharge today and continuation of Home Exercise Program outside the clinical setting. At this point, the patient is now discharged from skilled outpatient PT services.    Discharge reason: Patient requested discharge    Discharge FOTO Score: 23% limited    Wendi Is progressing well towards her goals.   Pt prognosis is Excellent.     Pt will continue to benefit from skilled outpatient physical therapy to address the deficits listed in the problem list box on initial evaluation, provide pt/family education and to maximize pt's level of independence in the home and community environment.     Pt's spiritual, cultural and educational needs considered and pt agreeable to plan of care and goals.     Anticipated barriers to physical therapy: none    Goals:   Short Term Goals: In 4 weeks:  1.Pt to be educated on HEP. (met)  2.Patient to demo increased AROM to 125 to improve functional mobility. (met)  3.Patient to increase strength LLE by 1/2 grade to improve functional tasks. (met)  4.Patient to have decreased pain to 5/10 to improve quality of movement. (MET: 6/26/23)  5.Patient to increase LE balance to good greater than 10 seconds without pain or instability. (MET: 6/26/23)  6.Patient to improve score on the FOTO by 5%. (met)      Long Term Goals: In 8 weeks  1. Patient to perform daily activities including recreational activities without limitation. (MET: 6/26/23)  2. Patient to demonstrate full knee AROM/PROM to WNL to improve functional mobility. (MET: 6/26/23)  3. Patient to demonstrate increased LE strength to 5/5 to improve functional tasks.(Partially met)  4. Patient to have decreased pain to 2/10 to improve quality of movement. (Not met but has improved)    PLAN     This patient is discharged from skilled outpatient Physical Therapy services.    Rehab tech/Extender was used to assist with treatment of this patient. PT present at all times.    Maria C Chowdhury, PT, DPT, Cert. DN     done

## 2024-05-03 NOTE — ASU DISCHARGE PLAN (ADULT/PEDIATRIC) - FOLLOW UP APPOINTMENTS
[facility] may also call Recovery Room (PACU) 24/7 @ [phone]/[facility]

## 2024-05-03 NOTE — ASU DISCHARGE PLAN (ADULT/PEDIATRIC) - NURSING INSTRUCTIONS
You received IV Tylenol for pain management at 8:15 AM. Please DO NOT take any Tylenol (Acetaminophen) containing products, such as Vicodin, Percocet, Excedrin, and cold medications for the next 6 hours (until 2:15 PM). DO NOT TAKE MORE THAN 4000 MG OF TYLENOL in a 24 hour period.

## 2024-05-03 NOTE — BRIEF OPERATIVE NOTE - NSICDXBRIEFPREOP_GEN_ALL_CORE_FT
PRE-OP DIAGNOSIS:  Adenomyosis of uterus 03-May-2024 08:51:26  Cande Bean  Anemia, blood loss 03-May-2024 08:51:15  Cande Bean  Uterine polyp 03-May-2024 08:50:54  Cande Bean

## 2024-05-03 NOTE — ASU DISCHARGE PLAN (ADULT/PEDIATRIC) - ASU DC SPECIAL INSTRUCTIONSFT
Discharge Instructions:  1. Diet: advance as tolerated  2. Activity limited by:   - no running, heavy lifting, strenuous exercise until cleared by MD   - nothing in vagina (bath, swim, intercourse, douching, tampons) until cleared by MD   3. Medications:   - Ibuprofen 600mg every 6 hours as needed for pain  - Acetaminophen 500mg every 6 hours as needed for pain  4. Follow-up appointment - 2 weeks. Please call the office upon discharge to schedule your appointment if you do not have one already.   5. Precautions:  - Call the office or go to the ED if you have any of the followin) Fever >100.4 that does not resolve  2) Intractable pain  3) Heavy bleeding

## 2024-05-03 NOTE — ASU DISCHARGE PLAN (ADULT/PEDIATRIC) - PROVIDER TOKENS
FREE:[LAST:[[facility]],PHONE:[phone]],FAX:[(   )    -],ADDRESS:[[facility]   [address] [place] [address]],FOLLOWUP:[2 weeks]]

## 2024-05-03 NOTE — BRIEF OPERATIVE NOTE - OPERATION/FINDINGS
Exam under anesthesia demonstrated normal external genitalia, bulky mobile uterus approx 14w in size, anteverted. No appreciable adenxal masses.   Hysteroscopy demonstrated a normal uterine contour. B/L ostia visualized and wnl. 2cm polyp with a base attachment on the right wall of the uterus, projecting into the cervix.   Fluid deficit: 650

## 2024-05-03 NOTE — BRIEF OPERATIVE NOTE - NSICDXBRIEFPOSTOP_GEN_ALL_CORE_FT
POST-OP DIAGNOSIS:  Uterine polyp 03-May-2024 08:51:34  Cande Bean  Anemia, blood loss 03-May-2024 08:51:31  Cande Bean  Adenomyosis of uterus 03-May-2024 08:51:29  Cande Bean

## 2024-05-03 NOTE — ASU DISCHARGE PLAN (ADULT/PEDIATRIC) - NS MD DC FALL RISK RISK
For information on Fall & Injury Prevention, visit: [email or web address] OR  [email or web address] OR  https://www.cdc.gov/steadi/patient.html

## 2024-05-03 NOTE — ASU PREOP CHECKLIST - IV STARTED
-- Message is from the [facility]--    Patient is requesting a medication refill - medication is on active medication list    Patient is currently OUT of the requested medication.    Was Medication Pended?  Yes.    Rx Name and Dose:    HYDROcodone-acetaminophen (NORCO) 5-325 MG per tablet 1 tablet, Oral, AT BEDTIME PRN         Summary: Take 1 tablet by mouth at bedtime as needed for Pain.  Eprescribe, Disp-30 tablet, R-0, PALMA   Dose, Frequency: 1 tablet, AT BEDTIME PRN  Start: 6/6/2019  Report  Long-term:   Pharmacy: [facility] [address] [place] [address]  Med Dose History          Duration: 30 days    Pharmacy  [facility] [address] [place] [address]    Patient confirmed the above pharmacy as correct?  Yes    Caller Information       Type Contact Phone    07/03/2019 03:28 PM Phone (Incoming) Maurice Morocho (Self) [phone] (H)          Alternative phone number: [phone]    Turnaround time given to caller:   \"This message will be sent to [state Provider's name]. The clinical team will fulfill your request as soon as they review your message.\"   yes

## 2024-05-07 PROBLEM — N84.0 POLYP OF CORPUS UTERI: Chronic | Status: ACTIVE | Noted: 2024-05-01

## 2024-05-09 LAB — SURGICAL PATHOLOGY STUDY: SIGNIFICANT CHANGE UP

## 2024-05-22 ENCOUNTER — OUTPATIENT (OUTPATIENT)
Dept: OUTPATIENT SERVICES | Facility: HOSPITAL | Age: 39
LOS: 1 days | End: 2024-05-22

## 2024-05-22 ENCOUNTER — APPOINTMENT (OUTPATIENT)
Dept: OBGYN | Facility: HOSPITAL | Age: 39
End: 2024-05-22
Payer: MEDICAID

## 2024-05-22 VITALS
HEIGHT: 68 IN | WEIGHT: 171 LBS | TEMPERATURE: 98.2 F | SYSTOLIC BLOOD PRESSURE: 111 MMHG | BODY MASS INDEX: 25.91 KG/M2 | HEART RATE: 63 BPM | DIASTOLIC BLOOD PRESSURE: 58 MMHG

## 2024-05-22 DIAGNOSIS — D25.9 LEIOMYOMA OF UTERUS, UNSPECIFIED: ICD-10-CM

## 2024-05-22 DIAGNOSIS — N84.0 POLYP OF CORPUS UTERI: ICD-10-CM

## 2024-05-22 PROCEDURE — 99213 OFFICE O/P EST LOW 20 MIN: CPT

## 2024-05-23 ENCOUNTER — APPOINTMENT (OUTPATIENT)
Dept: OBGYN | Facility: CLINIC | Age: 39
End: 2024-05-23

## 2024-05-23 DIAGNOSIS — D25.9 LEIOMYOMA OF UTERUS, UNSPECIFIED: ICD-10-CM

## 2024-05-23 DIAGNOSIS — N84.0 POLYP OF CORPUS UTERI: ICD-10-CM

## 2024-05-31 ENCOUNTER — APPOINTMENT (OUTPATIENT)
Dept: ULTRASOUND IMAGING | Facility: CLINIC | Age: 39
End: 2024-05-31
Payer: MEDICAID

## 2024-05-31 ENCOUNTER — OUTPATIENT (OUTPATIENT)
Dept: OUTPATIENT SERVICES | Facility: HOSPITAL | Age: 39
LOS: 1 days | End: 2024-05-31
Payer: MEDICAID

## 2024-05-31 DIAGNOSIS — D25.9 LEIOMYOMA OF UTERUS, UNSPECIFIED: ICD-10-CM

## 2024-05-31 DIAGNOSIS — Z96.7 PRESENCE OF OTHER BONE AND TENDON IMPLANTS: Chronic | ICD-10-CM

## 2024-05-31 PROCEDURE — 76830 TRANSVAGINAL US NON-OB: CPT | Mod: 26

## 2024-05-31 PROCEDURE — 76830 TRANSVAGINAL US NON-OB: CPT

## 2024-06-05 NOTE — PLAN
[FreeTextEntry1] :  -Obtain TVUS  -Continue to take Ibuprofen 600mg [id number] PRN when pain starts.  -Schedule RTC visit in 3 weeks   Patient seen and discussed with Dr. Sarabia and Dr. Anthony,   Donna Tapia MD, PGY-3  MIG[facility] Fellow Addendum Patient seen and evaluated with resident. Agree with above. Patient is a 39yo P0 presenting for POA s/p EUA and HSC polypectomy for HMB. The patient reports doingg well in the immediate postop period with improvmeent in subsequent menstrual period. She reports a LLQ pain after her menses that does not help with the pain. NO other associated symptoms however the patient is concerned for a recurrence of an ovarian cyst. Will obtain TVUS to evaluate adnexa. Pathology reviewed and benign. Patient to follow up for routine GYN care. Will discuss u/s findings with patient once obtained. D/W Dr. Gabrielle Sarabia, FMIGS2/PGY6

## 2024-06-05 NOTE — ASSESSMENT
[Doing Well] : is doing well [Fair Pain Control] : has fair pain control [No Sign of Infection] : is showing no signs of infection [de-identified] : 39 y/o P0 (LMP: 5/11-5/17) now POD #19 s/p EUA, Hysteroscopic Polypectomy for HMB here for postop visit. patient has mild left lower quadrant pain likely 2/2 post operative changes, fibroids or ovarian cyst.

## 2024-06-05 NOTE — HISTORY OF PRESENT ILLNESS
[Pain is not well-controlled] : pain is not well-controlled [Constipation] : constipation [None] : no vaginal bleeding [Pathology reviewed] : pathology reviewed [Fever] : no fever [Chills] : no chills [Nausea] : no nausea [Vomiting] : no vomiting [Diarrhea] : no diarrhea [Vaginal Bleeding] : no vaginal bleeding [Pelvic Pressure] : no pelvic pressure [Dysuria] : no dysuria [Vaginal Discharge] : no vaginal discharge [de-identified] : 19 [de-identified] : EUA, Hysteroscopic Polypectomy [de-identified] : HMB [de-identified] : 37 y/o P0 (LMP: 5/11-5/17) now POD #19 s/p EUA, Hysteroscopic Polypectomy for HMB. Patient states that she had normal postoperative watery vaginal discharge that has since resolved. Patient denies postoperative pain immediately after her surgery. She states that she recently had a menstrual period between 5/11-5/17 which is shorter and lighter than her usual 12-14-day menses. Patient also states that she has had new LLQ abdominal pain that started two days after her period radiating to her back and thighs. Patient suspects that this pain is due to an ovarian cyst.  Patient has been taking ibuprofen infrequently though states that it has been taking longer to make an impact than when she takes it for her usual menstrual cramps.   [de-identified] : Patient states that pain was mostly well controlled in the immediate post operative period, however, she has new 6/10 LLQ pain that started 2 days after her most recent menstrual period [de-identified] : TTP in LLQ non radiating; Uterine fundus palpated to 3 cm below umbilicus.

## 2024-06-12 ENCOUNTER — APPOINTMENT (OUTPATIENT)
Dept: OBGYN | Facility: HOSPITAL | Age: 39
End: 2024-06-12
Payer: MEDICAID

## 2024-06-12 ENCOUNTER — OUTPATIENT (OUTPATIENT)
Dept: OUTPATIENT SERVICES | Facility: HOSPITAL | Age: 39
LOS: 1 days | End: 2024-06-12

## 2024-06-12 VITALS
BODY MASS INDEX: 25.01 KG/M2 | DIASTOLIC BLOOD PRESSURE: 59 MMHG | TEMPERATURE: 97.9 F | HEIGHT: 68 IN | SYSTOLIC BLOOD PRESSURE: 111 MMHG | WEIGHT: 165 LBS | HEART RATE: 72 BPM

## 2024-06-12 DIAGNOSIS — Z96.7 PRESENCE OF OTHER BONE AND TENDON IMPLANTS: Chronic | ICD-10-CM

## 2024-06-12 PROCEDURE — 99213 OFFICE O/P EST LOW 20 MIN: CPT | Mod: GC

## 2024-06-13 DIAGNOSIS — K59.00 CONSTIPATION, UNSPECIFIED: ICD-10-CM

## 2024-06-19 NOTE — REVIEW OF SYSTEMS
[Abdominal Pain] : abdominal pain [Constipation] : constipation [Bloating] : bloating [Fever] : no fever [Chills] : no chills [Diarrhea] : diarrhea [Vomiting] : no vomiting [Melena] : no melena [Nausea] : no nausea [Urgency] : no urgency [Frequency] : no frequency [Dysuria] : no dysuria [Abn Vaginal bleeding] : no abnormal vaginal bleeding

## 2024-06-19 NOTE — PHYSICAL EXAM
[Appropriately responsive] : appropriately responsive [Soft] : soft [Non-tender] : non-tender [Non-distended] : non-distended [FreeTextEntry4] : Well perfused. [FreeTextEntry5] : Unlabored breathing.

## 2024-06-19 NOTE — PLAN
[FreeTextEntry1] : 39yo P0 s/p EUA and HSC polypectomy for HMB on 5/3 now with improved vaginal bleeding, however with occasional LLQ pain associated with bloating and alleviated by BM. TVUS (5/31/24) reveals uterus 14.2 x 10.3 x 10.1 cm. 5.8 x 4.4 x 5.5 cm posterior body intramural/subserosal myoma. Normal EMS and BTO. Differential diagnosis includes pain secondary to constipation in the setting of oral Fe supplementation, possibly exacerbated by 5.8cm posterior IM/SS fibroid which could cause compression against colon. Vitals stable with non-tender abdominal exam.   Explained to patient that oral Fe likely causing mild constipation symptoms.  Discussed options for stool softeners, including OTC Colace and Miralax PRN for constipation. Reassured patient that  her ovaries and adnexa are unremarkable on TVUS and that pain is likely secondary to constipation. All questions and concerns addressed to patient's apparent satisfaction.  Advised patient to follow up with hematologist tomorrow as scheduled to discuss anemia. Given that vaginal bleeding is much improved following polypectomy, patient instructed to follow up as needed.   Evaluated with Samaria AGUILAR Fellow Meagan Perez, PGY-1  FMIGS Fellow Addendum Patient seen and evaluated with resident. Agree with above. Patient is a 39yo P0 POD19 s/p EUA ad HSC Polypectomy presenting for follow up of LLQ pain noted at her POA. At the last visit a pelvic ultrasound was ordered. It was wnl and reviewed with the patient today. She reports discomfort is still present (regardless of menses) but not severe. Also reports constipation while still taking PO iron. Encouraged patient to take Colace or Miralax; especially while taking PO iron, to assist with regular BMs. Questions and concerns addresed to patien satisfaction. To follow up for routine GYN care. D/W Dr. Gabrielle Sarabia, FMIGS2

## 2024-06-19 NOTE — HISTORY OF PRESENT ILLNESS
[FreeTextEntry1] : Patient is a 37yo P0 s/p EUA and HSC polypectomy for HMB on 5/3. During the patient's POA on POD#19, the patient reported doing well in the immediate postop period with improvement in subsequent menstrual period, however she reported severe LLQ pain at the start of her menses. No other associated symptoms however the patient expressed concerned for a recurrence of an ovarian cyst. A TVUS was obtained (see below) to evaluate adnexa and the patient was instructed to take ibuprofen for pain. Patient presents for 3 week follow up of LLQ pain.  TVUS (5/31/24): Ut 14.2 x 10.3 x 10.1 cm. 5.8 x 4.4 x 5.5 cm posterior body intramural/subserosal myoma. Endometrium: 7 mm. Within normal limits. BTO wnl. No FF.  Patient continues to do well in terms of vaginal bleeding. States that she had her second period since the surgery and continues to report that her bleeding is much lighter and shorter in duration after the polypectomy. She reports that she again felt the LLQ at the start of her menses, described as constant and lasting for 1-2 days. She took Ibuprofen with good relief. Upon further clarification, the patient notes that her LLQ pain is associated with bloating and is alleviated after having BMs. Further discussion reveals that the patient also experiences this LLQ pain outside of her menses (~1-2x per month) for the past 4-5 years, around the time that she began bleeding heavily and started taking oral Fe. She admits to occasional mild constipation (has BMs q1-2 days). She notes that she continues to take oral Fe for her history of anemia. She has an appointment with her hematologist tomorrow. Denies any current abdominal pain in clinic.  Denies fevers, chills, diarrhea, hematochezia, melena, nausea, vomiting.

## 2024-07-09 NOTE — ED PROVIDER NOTE - CROS ED CONS ALL NEG
negative... Benefits, risks, and possible complications of procedure explained to patient/caregiver who verbalized understanding and gave verbal consent.

## 2024-07-25 ENCOUNTER — APPOINTMENT (OUTPATIENT)
Dept: OBGYN | Facility: CLINIC | Age: 39
End: 2024-07-25
Payer: MEDICAID

## 2024-07-25 VITALS
BODY MASS INDEX: 24.71 KG/M2 | DIASTOLIC BLOOD PRESSURE: 66 MMHG | SYSTOLIC BLOOD PRESSURE: 113 MMHG | HEART RATE: 66 BPM | HEIGHT: 68 IN | WEIGHT: 163 LBS

## 2024-07-25 DIAGNOSIS — D25.9 LEIOMYOMA OF UTERUS, UNSPECIFIED: ICD-10-CM

## 2024-07-25 DIAGNOSIS — N80.03 ADENOMYOSIS OF THE UTERUS: ICD-10-CM

## 2024-07-25 PROCEDURE — 99213 OFFICE O/P EST LOW 20 MIN: CPT

## 2024-07-25 RX ORDER — RIFAMPIN 150 MG/1
150 CAPSULE ORAL
Refills: 0 | Status: ACTIVE | COMMUNITY

## 2024-07-25 NOTE — HISTORY OF PRESENT ILLNESS
[FreeTextEntry1] : 38 yo P0 here w/worsening HMB and dysmenorrhea. No help from ibuprofen or midol. Main issue is pain. "worse than labor pain"  Pt interested in TTC now.  Preop MRI showed diffuse adeno and fibroids.

## 2024-07-25 NOTE — DISCUSSION/SUMMARY
[FreeTextEntry1] : Spoke to pt at length about challenges of adeno. Spoke about option for medical trial with Lupron. Pt interested in TTC now. We spoke about cycle length and finding optimal time to TTC. Also explained that adeno may affect conception. Questions answered.  Plan Pt will keep menstrual diary. Will try to adjust pain management  F/u in 4-6 months if not pregnant. Would consider JOSELIN at that time.

## 2025-01-29 ENCOUNTER — OUTPATIENT (OUTPATIENT)
Dept: OUTPATIENT SERVICES | Facility: HOSPITAL | Age: 40
LOS: 1 days | End: 2025-01-29

## 2025-01-29 ENCOUNTER — APPOINTMENT (OUTPATIENT)
Dept: OBGYN | Facility: HOSPITAL | Age: 40
End: 2025-01-29

## 2025-01-29 VITALS
WEIGHT: 165 LBS | DIASTOLIC BLOOD PRESSURE: 62 MMHG | HEIGHT: 68 IN | BODY MASS INDEX: 25.01 KG/M2 | SYSTOLIC BLOOD PRESSURE: 116 MMHG | HEART RATE: 64 BPM | TEMPERATURE: 98.1 F

## 2025-01-29 DIAGNOSIS — Z96.7 PRESENCE OF OTHER BONE AND TENDON IMPLANTS: Chronic | ICD-10-CM

## 2025-01-29 DIAGNOSIS — D25.9 LEIOMYOMA OF UTERUS, UNSPECIFIED: ICD-10-CM

## 2025-01-29 DIAGNOSIS — N80.03 ADENOMYOSIS OF THE UTERUS: ICD-10-CM

## 2025-01-29 DIAGNOSIS — N94.6 DYSMENORRHEA, UNSPECIFIED: ICD-10-CM

## 2025-01-29 PROCEDURE — 99213 OFFICE O/P EST LOW 20 MIN: CPT | Mod: GC

## 2025-01-30 DIAGNOSIS — N94.6 DYSMENORRHEA, UNSPECIFIED: ICD-10-CM

## 2025-01-30 DIAGNOSIS — D25.9 LEIOMYOMA OF UTERUS, UNSPECIFIED: ICD-10-CM

## 2025-01-30 DIAGNOSIS — N80.03 ADENOMYOSIS OF THE UTERUS: ICD-10-CM

## 2025-02-06 ENCOUNTER — NON-APPOINTMENT (OUTPATIENT)
Age: 40
End: 2025-02-06

## 2025-02-16 ENCOUNTER — OUTPATIENT (OUTPATIENT)
Dept: OUTPATIENT SERVICES | Facility: HOSPITAL | Age: 40
LOS: 1 days | End: 2025-02-16
Payer: COMMERCIAL

## 2025-02-16 DIAGNOSIS — D25.9 LEIOMYOMA OF UTERUS, UNSPECIFIED: ICD-10-CM

## 2025-02-16 DIAGNOSIS — Z96.7 PRESENCE OF OTHER BONE AND TENDON IMPLANTS: Chronic | ICD-10-CM

## 2025-02-16 PROCEDURE — A9585: CPT

## 2025-02-16 PROCEDURE — 74183 MRI ABD W/O CNTR FLWD CNTR: CPT

## 2025-02-16 PROCEDURE — 72197 MRI PELVIS W/O & W/DYE: CPT

## 2025-04-30 ENCOUNTER — APPOINTMENT (OUTPATIENT)
Dept: OBGYN | Facility: HOSPITAL | Age: 40
End: 2025-04-30

## 2025-04-30 ENCOUNTER — OUTPATIENT (OUTPATIENT)
Dept: OUTPATIENT SERVICES | Facility: HOSPITAL | Age: 40
LOS: 1 days | End: 2025-04-30

## 2025-04-30 VITALS
DIASTOLIC BLOOD PRESSURE: 54 MMHG | TEMPERATURE: 98.2 F | HEART RATE: 57 BPM | HEIGHT: 68 IN | SYSTOLIC BLOOD PRESSURE: 117 MMHG | WEIGHT: 158 LBS | BODY MASS INDEX: 23.95 KG/M2

## 2025-04-30 DIAGNOSIS — D25.9 LEIOMYOMA OF UTERUS, UNSPECIFIED: ICD-10-CM

## 2025-04-30 DIAGNOSIS — N80.03 ADENOMYOSIS OF THE UTERUS: ICD-10-CM

## 2025-04-30 DIAGNOSIS — Z96.7 PRESENCE OF OTHER BONE AND TENDON IMPLANTS: Chronic | ICD-10-CM

## 2025-04-30 PROCEDURE — 99213 OFFICE O/P EST LOW 20 MIN: CPT | Mod: GC

## 2025-04-30 NOTE — ED ADULT NURSE NOTE - SUICIDE SCREENING QUESTION 1
4/30/2025      Lance Mei  [address] [place] [address]      Dear Lance Mei,                                                                                                       Coleman Hui MD wrote a referral for you to see a Behavioral Health provider. Our attempt to reach you by phone has been unsuccessful.     Please call the [facility] Scheduling Patient Line [phone]  at your earliest convenience. Let the  know that a behavioral health referral has been ordered and you are calling to discuss the referral.    We look forward to assisting you with your health care needs.    Sincerely,    [facility] Scheduling Patient Line [phone]        No

## 2025-05-01 DIAGNOSIS — D25.9 LEIOMYOMA OF UTERUS, UNSPECIFIED: ICD-10-CM

## 2025-05-01 DIAGNOSIS — N80.03 ADENOMYOSIS OF THE UTERUS: ICD-10-CM

## 2025-05-27 ENCOUNTER — EMERGENCY (EMERGENCY)
Facility: HOSPITAL | Age: 40
LOS: 0 days | Discharge: HOME | End: 2025-05-27
Attending: STUDENT IN AN ORGANIZED HEALTH CARE EDUCATION/TRAINING PROGRAM
Payer: OTHER MISCELLANEOUS

## 2025-05-27 VITALS
OXYGEN SATURATION: 100 % | TEMPERATURE: 99 F | SYSTOLIC BLOOD PRESSURE: 102 MMHG | HEART RATE: 58 BPM | RESPIRATION RATE: 17 BRPM | DIASTOLIC BLOOD PRESSURE: 65 MMHG

## 2025-05-27 VITALS
RESPIRATION RATE: 18 BRPM | HEIGHT: 68 IN | HEART RATE: 66 BPM | SYSTOLIC BLOOD PRESSURE: 98 MMHG | WEIGHT: 154.98 LBS | OXYGEN SATURATION: 100 % | DIASTOLIC BLOOD PRESSURE: 62 MMHG | TEMPERATURE: 98 F

## 2025-05-27 DIAGNOSIS — Z96.7 PRESENCE OF OTHER BONE AND TENDON IMPLANTS: Chronic | ICD-10-CM

## 2025-05-27 LAB — HCG UR QL: NEGATIVE — SIGNIFICANT CHANGE UP

## 2025-05-27 PROCEDURE — 72125 CT NECK SPINE W/O DYE: CPT | Mod: 26

## 2025-05-27 PROCEDURE — 70450 CT HEAD/BRAIN W/O DYE: CPT | Mod: 26

## 2025-05-27 PROCEDURE — 73502 X-RAY EXAM HIP UNI 2-3 VIEWS: CPT | Mod: 26,RT

## 2025-05-27 PROCEDURE — 99285 EMERGENCY DEPT VISIT HI MDM: CPT

## 2025-05-27 RX ORDER — ACETAMINOPHEN 500 MG/5ML
650 LIQUID (ML) ORAL ONCE
Refills: 0 | Status: COMPLETED | OUTPATIENT
Start: 2025-05-27 | End: 2025-05-27

## 2025-05-27 RX ORDER — KETOROLAC TROMETHAMINE 30 MG/ML
15 INJECTION, SOLUTION INTRAMUSCULAR; INTRAVENOUS ONCE
Refills: 0 | Status: DISCONTINUED | OUTPATIENT
Start: 2025-05-27 | End: 2025-05-27

## 2025-05-27 RX ADMIN — Medication 650 MILLIGRAM(S): at 20:01

## 2025-05-27 RX ADMIN — KETOROLAC TROMETHAMINE 15 MILLIGRAM(S): 30 INJECTION, SOLUTION INTRAMUSCULAR; INTRAVENOUS at 20:01

## 2025-05-27 NOTE — ED PROVIDER NOTE - PATIENT PORTAL LINK FT
You can access the FollowMyHealth Patient Portal offered by [facility] by registering at the following website: [email or web address]. By joining [employer]’s FollowMyHealth portal, you will also be able to view your health information using other applications (apps) compatible with our system.

## 2025-05-27 NOTE — ED ADULT NURSE NOTE - OBJECTIVE STATEMENT
Pt fell at work on Sunday while at work. Blurry vison, dizziness. fell backward from chair was assisted up by coworker. Headache and R hip. vomiting today. Demeclocycline. F/u with neuro, 5/12/2025 Pt presents to ED A&Ox3 ambulatory with equal steady gait c/o headache, blurry vision, emesis and  R hip pain s/p fall from chair while at work on Sunday. 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 Pt reports being evaluated at work but did not get a scan done. Pt remembers events leading up to fall, but needed to assisted up by coworkers. LMP 5/12/2025. Hx of anemia

## 2025-05-27 NOTE — ED PROVIDER NOTE - NS ED ATTENDING STATEMENT MOD
I have seen and examined this patient and fully participated in the care of this patient as the teaching attending.  The service was shared with the SWETHA.  I reviewed and verified the documentation.

## 2025-05-27 NOTE — ED ADULT TRIAGE NOTE - CHIEF COMPLAINT QUOTE
pt c/o headache, blurred vison ( both eye) and right hip pain after a fall at work 2 days ago. pt fell out of the chair and struck her head on floor. denies loc. pt was seen in the ED after the 2 days ago and did not get a head CT. . history of anemia.

## 2025-05-27 NOTE — ED PROVIDER NOTE - CLINICAL SUMMARY MEDICAL DECISION MAKING FREE TEXT BOX
39 year old female with h/o uterine fibroids, endometriosis and anemia presents today c/o headaches, vomiting , right hip pain and dizziness x 2 days.  Pt reports that while at work on Sunday she was attempting to sit on a rolling chair when the chair slipped from under her causing her to fall backwards, Pt did strike the back of her head, denies LOC and was assisted up by co workers.  She was seen in the ER at her job and was given motrin and discharged, no imaging was done.  The following day pt reports having headaches, describes as diffuse and throbbing, rated  8/10.  She also c/o vomiting once yesterday and again this morning and right hip point tenderness.  She denies any other injuries,  her exam is as noted. DDx includes but not limited to concussion, bleed, fracture.  Plan- ct, xrays, pain control.  Will reassess and dispo 39 year old female with h/o uterine fibroids, endometriosis and anemia presents today c/o headaches, vomiting , right hip pain and dizziness x 2 days.  Pt reports that while at work on Sunday she was attempting to sit on a rolling chair when the chair slipped from under her causing her to fall backwards, Pt did strike the back of her head, denies LOC and was assisted up by co workers.  She was seen in the ER at her job and was given motrin and discharged, no imaging was done.  The following day pt reports having headaches, describes as diffuse and throbbing, rated  8/10.  She also c/o vomiting once yesterday and again this morning and right hip point tenderness.  She denies any other injuries,  her exam is as noted. DDx includes but not limited to concussion, bleed, fracture.  Plan- ct, xrays, pain control.  Will reassess and dispo    FLOR Kaur: 40 y/o F with uterine fibroids, endometriosis, anemia, with L sided headache and R hip pain s/p fall with head trauma 2 days ago.   +1 episode of vomiting earlier today.   no cp, sob, back pain, abd pain, other extremity pain, fever, other sxs  xr neg for acute path as ready by me  no neck tenderness.   cts neg for acute path, likely concussive syndrome  will give meds, pt does not want to wait for reassessmnt after meds.   Reviewed all results and necessity for follow up. Counseled on red flags and to return for them.  Patient appears well on discharge.

## 2025-05-27 NOTE — ED ADULT NURSE NOTE - DISCHARGE DATE/TIME
I have re-evaluated the patient's fluid status and reviewed vital signs. Clinical perfusion assessment was performed.
27-May-2025 20:09

## 2025-06-20 ENCOUNTER — OUTPATIENT (OUTPATIENT)
Dept: OUTPATIENT SERVICES | Facility: HOSPITAL | Age: 40
LOS: 1 days | End: 2025-06-20

## 2025-06-20 VITALS
SYSTOLIC BLOOD PRESSURE: 111 MMHG | WEIGHT: 158.07 LBS | RESPIRATION RATE: 16 BRPM | TEMPERATURE: 98 F | HEART RATE: 61 BPM | OXYGEN SATURATION: 100 % | HEIGHT: 68 IN

## 2025-06-20 DIAGNOSIS — D25.9 LEIOMYOMA OF UTERUS, UNSPECIFIED: ICD-10-CM

## 2025-06-20 DIAGNOSIS — Z98.890 OTHER SPECIFIED POSTPROCEDURAL STATES: Chronic | ICD-10-CM

## 2025-06-20 DIAGNOSIS — Z96.7 PRESENCE OF OTHER BONE AND TENDON IMPLANTS: Chronic | ICD-10-CM

## 2025-06-20 LAB
BLD GP AB SCN SERPL QL: NEGATIVE — SIGNIFICANT CHANGE UP
RH IG SCN BLD-IMP: POSITIVE — SIGNIFICANT CHANGE UP
RH IG SCN BLD-IMP: POSITIVE — SIGNIFICANT CHANGE UP

## 2025-06-20 NOTE — H&P PST ADULT - NSICDXPASTSURGICALHX_GEN_ALL_CORE_FT
PAST SURGICAL HISTORY:  S/P dilation and curettage     S/P ORIF (open reduction internal fixation) fracture ' 2009  Left Clavicle fracture

## 2025-06-20 NOTE — H&P PST ADULT - PROBLEM SELECTOR PLAN 1
Scheduled for  Robotic assisted myomectomy possible  laparotomy  Preop instructions provided and patient verbalizes understanding.  Hibiclens provided with instructions.  CBC, T&S results  pending

## 2025-06-20 NOTE — H&P PST ADULT - PSYCHIATRIC
Pseudomonas urinary tract infection Pseudomonas urinary tract infection Pseudomonas urinary tract infection details… normal/normal affect/alert and oriented x3/normal behavior

## 2025-06-20 NOTE — H&P PST ADULT - LIVES WITH, PROFILE
Heart Failure: Making Changes to Your Diet  You have a condition called heart failure. When you have heart failure, excess fluid is more likely to build up in your body because your heart isn't working well. This makes the heart work harder to pump blood. Fluid buildup causes symptoms such as shortness of breath and swelling (edema). This is often referred to as congestive heart failure or CHF. Controlling the amount of salt (sodium) you eat may help stop fluid from building up. Your doctor may also tell you to reduce the amount of fluid you drink.  Reading food labels    Your healthcare provider will tell you how much sodium you can eat each day. Read food labels to keep track. Keep in mind that certain foods are high in salt. These include canned, frozen, and processed foods. Check the amount of sodium in each serving. Watch out for high-sodium ingredients. These include MSG (monosodium glutamate), baking soda, and sodium phosphate.   Eating less salt  Give yourself time to get used to eating less salt. It may take a little while. Here are some tips to help:  · Take the saltshaker off the table. Replace it with salt-free herb mixes and spices.  · Eat fresh or plain frozen vegetables. These have much less salt than canned vegetables.  · Choose low-sodium snacks like sodium-free pretzels, crackers, or air-popped popcorn.  · Dont add salt to your food when youre cooking. Instead, season your foods with pepper, lemon, garlic, or onion.  · When you eat out, ask that your food be cooked without added salt.  · Avoid eating fried foods as these often have a great deal of salt.  If youre told to limit fluids  You may need to limit how much fluid you have to help prevent swelling. This includes anything that is liquid at room temperature, such as ice cream and soup. If your doctor tells you to limit fluid, try these tips:  · Measure drinks in a measuring cup before you drink them. This will help you meet daily  goals.  · Chill drinks to make them more refreshing.  · Suck on frozen lemon wedges to quench thirst.  · Only drink when youre thirsty.  · Chew sugarless gum or suck on hard candy to keep your mouth moist.  · Weigh yourself daily to know if your body's fluid content is rising.  My sodium goal  Your healthcare provider may give you a sodium goal to meet each day. This includes sodium found in food as well as salt that you add. My goal is to eat no more than ___________ mg of sodium per day.     When to call your doctor  Call your doctor right away if you have any symptoms of worsening heart failure. These can include:  · Sudden weight gain  · Increased swelling of your legs or ankles  · Trouble breathing when youre resting or at night  · Increase in the number of pillows you have to sleep on  · Chest pain, pressure, discomfort, or pain in the jaw, neck, or back   Date Last Reviewed: 3/21/2016  © 9274-5938 [employer]. [address] [place] [address], [place] [address]. All rights reserved. This information is not intended as a substitute for professional medical care. Always follow your healthcare professional's instructions.         alone

## 2025-06-20 NOTE — H&P PST ADULT - HISTORY OF PRESENT ILLNESS
States menses occur monthly, but now are lighter than previous - currently lasting 6-7 days and heavy for the first two days (completely saturating 3-4 overnight pads per day). Reports menstrual cramping is significantly improved with Ibuprofen. Endorses additional symptoms of boating and constipation. Since last visit, patient has undergone pelvic MRI for surgical planning as below. Patient has been trying to conceive x2 years and that she had been previously seen by Dr. Luisa Handley (JOSELIN) in late 2023, who had recommended she get her fibroids removed prior to any JOSELIN interventions. Pt Now present in Presurgical testing for preop evaluation for scheduled procedure Robotic assisted myomectomy posssibllaparotomy    38 yo female hx uterine fibroids, uterine polyp s/p D&C.  c/o menorrhagia 1st  for 2-3 days, abdominal bloating increase pelvic pain and tenderness MRI done 4/24/25 noted increase in size of uterine fibroids.      Pt re-evaluted by GYN surgical intervention recommended Pt Now present in Presurgical testing for preop evaluation for scheduled procedure Robotic assisted myomectomy possible  laparotomy    40 yo female hx uterine fibroids, uterine polyp s/p D&C 5/2024 .  Pt remain C/o menorrhagia during  for 2-3 days during menstruals cycle, abdominal bloating increase pelvic pain and tenderness lower abdomen. Pt fibroids under surveillance MRI done 4/24/25 noted increase in size of uterine fibroids.      Pt re-evaluated by GYN surgical intervention recommended

## 2025-06-20 NOTE — H&P PST ADULT - FEMALE-SPECIFIC SYMPTOMS
intermittently, menorrhagia only for 2 days of menstrual cycle/pelvic pain intermittently, menorrhagia only for 2 days of menstrual cycle, which has improved after D&C done 5/24/pelvic pain

## 2025-06-21 LAB
ALBUMIN SERPL ELPH-MCNC: 4.2 G/DL — SIGNIFICANT CHANGE UP (ref 3.3–5)
ALP SERPL-CCNC: 87 U/L — SIGNIFICANT CHANGE UP (ref 40–120)
ALT FLD-CCNC: 18 U/L — SIGNIFICANT CHANGE UP (ref 10–40)
ANION GAP SERPL CALC-SCNC: 14 MMOL/L — SIGNIFICANT CHANGE UP (ref 5–17)
AST SERPL-CCNC: 13 U/L — SIGNIFICANT CHANGE UP (ref 10–35)
BASOPHILS # BLD AUTO: 0.02 K/UL — SIGNIFICANT CHANGE UP (ref 0–0.2)
BASOPHILS NFR BLD AUTO: 0.5 % — SIGNIFICANT CHANGE UP (ref 0–2)
BILIRUB SERPL-MCNC: <0.2 MG/DL — SIGNIFICANT CHANGE UP (ref 0.2–1.2)
BUN SERPL-MCNC: 9 MG/DL — SIGNIFICANT CHANGE UP (ref 7–23)
CALCIUM SERPL-MCNC: 9.2 MG/DL — SIGNIFICANT CHANGE UP (ref 8.4–10.5)
CHLORIDE SERPL-SCNC: 104 MMOL/L — SIGNIFICANT CHANGE UP (ref 96–108)
CO2 SERPL-SCNC: 20 MMOL/L — LOW (ref 22–31)
CREAT SERPL-MCNC: 0.67 MG/DL — SIGNIFICANT CHANGE UP (ref 0.5–1.3)
CRP SERPL-MCNC: <3 MG/L — SIGNIFICANT CHANGE UP
EGFR: 113 ML/MIN/1.73M2 — SIGNIFICANT CHANGE UP
EGFR: 113 ML/MIN/1.73M2 — SIGNIFICANT CHANGE UP
EOSINOPHIL # BLD AUTO: 0.11 K/UL — SIGNIFICANT CHANGE UP (ref 0–0.5)
EOSINOPHIL NFR BLD AUTO: 3 % — SIGNIFICANT CHANGE UP (ref 0–6)
FERRITIN SERPL-MCNC: 87 NG/ML — SIGNIFICANT CHANGE UP (ref 15–150)
FOLATE SERPL-MCNC: >20 NG/ML — SIGNIFICANT CHANGE UP
GLUCOSE SERPL-MCNC: 44 MG/DL — CRITICAL LOW (ref 70–99)
HCT VFR BLD CALC: 35.4 % — SIGNIFICANT CHANGE UP (ref 34.5–45)
HGB BLD-MCNC: 10.9 G/DL — LOW (ref 11.5–15.5)
IMM GRANULOCYTES NFR BLD AUTO: 0 % — SIGNIFICANT CHANGE UP (ref 0–0.9)
IRON SATN MFR SERPL: 23 % — SIGNIFICANT CHANGE UP (ref 14–50)
IRON SATN MFR SERPL: 65 UG/DL — SIGNIFICANT CHANGE UP (ref 30–160)
LYMPHOCYTES # BLD AUTO: 1.44 K/UL — SIGNIFICANT CHANGE UP (ref 1–3.3)
LYMPHOCYTES # BLD AUTO: 39.2 % — SIGNIFICANT CHANGE UP (ref 13–44)
MCHC RBC-ENTMCNC: 28.8 PG — SIGNIFICANT CHANGE UP (ref 27–34)
MCHC RBC-ENTMCNC: 30.8 G/DL — LOW (ref 32–36)
MCV RBC AUTO: 93.7 FL — SIGNIFICANT CHANGE UP (ref 80–100)
MONOCYTES # BLD AUTO: 0.3 K/UL — SIGNIFICANT CHANGE UP (ref 0–0.9)
MONOCYTES NFR BLD AUTO: 8.2 % — SIGNIFICANT CHANGE UP (ref 2–14)
NEUTROPHILS # BLD AUTO: 1.8 K/UL — SIGNIFICANT CHANGE UP (ref 1.8–7.4)
NEUTROPHILS NFR BLD AUTO: 49.1 % — SIGNIFICANT CHANGE UP (ref 43–77)
PLATELET # BLD AUTO: 266 K/UL — SIGNIFICANT CHANGE UP (ref 150–400)
POTASSIUM SERPL-MCNC: 4.7 MMOL/L — SIGNIFICANT CHANGE UP (ref 3.5–5.3)
POTASSIUM SERPL-SCNC: 4.7 MMOL/L — SIGNIFICANT CHANGE UP (ref 3.5–5.3)
PROT SERPL-MCNC: 6.5 G/DL — SIGNIFICANT CHANGE UP (ref 6–8.3)
RBC # BLD: 3.78 M/UL — LOW (ref 3.8–5.2)
RBC # FLD: 14.4 % — SIGNIFICANT CHANGE UP (ref 10.3–14.5)
RETICS #: 66.2 K/UL — SIGNIFICANT CHANGE UP (ref 25–125)
RETICS/RBC NFR: 1.8 % — SIGNIFICANT CHANGE UP (ref 0.5–2.5)
SODIUM SERPL-SCNC: 138 MMOL/L — SIGNIFICANT CHANGE UP (ref 135–145)
TIBC SERPL-MCNC: 276 UG/DL — SIGNIFICANT CHANGE UP (ref 220–430)
UIBC SERPL-MCNC: 212 UG/DL — SIGNIFICANT CHANGE UP (ref 110–370)
VIT B12 SERPL-MCNC: 840 PG/ML — SIGNIFICANT CHANGE UP (ref 232–1245)
WBC # BLD: 3.67 K/UL — LOW (ref 3.8–10.5)
WBC # FLD AUTO: 3.67 K/UL — LOW (ref 3.8–10.5)

## 2025-06-24 ENCOUNTER — NON-APPOINTMENT (OUTPATIENT)
Age: 40
End: 2025-06-24

## 2025-06-26 ENCOUNTER — NON-APPOINTMENT (OUTPATIENT)
Age: 40
End: 2025-06-26

## 2025-06-27 ENCOUNTER — APPOINTMENT (OUTPATIENT)
Dept: OBGYN | Facility: HOSPITAL | Age: 40
End: 2025-06-27

## 2025-06-27 ENCOUNTER — NON-APPOINTMENT (OUTPATIENT)
Age: 40
End: 2025-06-27

## 2025-06-27 RX ORDER — FOLIC ACID 1 MG/1
0 TABLET ORAL
Refills: 0 | DISCHARGE

## 2025-07-08 ENCOUNTER — NON-APPOINTMENT (OUTPATIENT)
Age: 40
End: 2025-07-08

## 2025-07-24 ENCOUNTER — NON-APPOINTMENT (OUTPATIENT)
Age: 40
End: 2025-07-24

## 2025-07-24 ENCOUNTER — APPOINTMENT (OUTPATIENT)
Dept: GYNECOLOGIC ONCOLOGY | Facility: CLINIC | Age: 40
End: 2025-07-24
Payer: COMMERCIAL

## 2025-07-24 VITALS
HEART RATE: 68 BPM | WEIGHT: 160 LBS | OXYGEN SATURATION: 99 % | RESPIRATION RATE: 16 BRPM | SYSTOLIC BLOOD PRESSURE: 111 MMHG | TEMPERATURE: 98.3 F | HEIGHT: 68 IN | BODY MASS INDEX: 24.25 KG/M2 | DIASTOLIC BLOOD PRESSURE: 76 MMHG

## 2025-07-24 DIAGNOSIS — D25.9 LEIOMYOMA OF UTERUS, UNSPECIFIED: ICD-10-CM

## 2025-07-24 DIAGNOSIS — D50.0 IRON DEFICIENCY ANEMIA SECONDARY TO BLOOD LOSS (CHRONIC): ICD-10-CM

## 2025-07-24 PROCEDURE — 99204 OFFICE O/P NEW MOD 45 MIN: CPT

## 2025-07-24 PROCEDURE — 99459 PELVIC EXAMINATION: CPT

## (undated) DEVICE — DRAPE TOWEL BLUE 17" X 24"

## (undated) DEVICE — DRSG TELFA 3 X 8

## (undated) DEVICE — WARMING BLANKET FULL ADULT

## (undated) DEVICE — DRAPE LIGHT HANDLE COVER (GREEN)

## (undated) DEVICE — DRAPE IRRIGATION POUCH 19X23"

## (undated) DEVICE — POSITIONER STRAP ARMBOARD VELCRO TS-30

## (undated) DEVICE — ELCTR BOVIE PENCIL SMOKE EVACUATION

## (undated) DEVICE — DRSG PAD SANITARY OB

## (undated) DEVICE — PACK D&C

## (undated) DEVICE — PRESSURE INFUSOR BAG 1000ML

## (undated) DEVICE — VISITEC 4X4

## (undated) DEVICE — FLUENT FMS PROCEDURE KIT

## (undated) DEVICE — TUBING IRR SET FOR CYSTOSCOPY 77"

## (undated) DEVICE — MYOSURE SCOPE SEAL

## (undated) DEVICE — PACK PERI GYN

## (undated) DEVICE — LABELS BLANK W PEN

## (undated) DEVICE — PREP BETADINE KIT

## (undated) DEVICE — SOL IRR POUR H2O 500ML

## (undated) DEVICE — GOWN LG

## (undated) DEVICE — SOL IRR POUR NS 0.9% 1000ML

## (undated) DEVICE — TUBING SUCTION NONCONDUCTIVE 6MM X 12FT

## (undated) DEVICE — BASIN SET DOUBLE

## (undated) DEVICE — VENODYNE/SCD SLEEVE CALF MEDIUM

## (undated) DEVICE — DRAPE UNDER BUTTOCKS W SCREEN